# Patient Record
Sex: MALE | Race: WHITE | Employment: FULL TIME | URBAN - NONMETROPOLITAN AREA
[De-identification: names, ages, dates, MRNs, and addresses within clinical notes are randomized per-mention and may not be internally consistent; named-entity substitution may affect disease eponyms.]

---

## 2016-05-20 LAB — GLUCOSE BLD-MCNC: 87 MG/DL

## 2016-10-12 LAB
CHOLESTEROL, TOTAL: 130 MG/DL
CHOLESTEROL/HDL RATIO: 130
HDLC SERPL-MCNC: 46 MG/DL (ref 35–70)
LDL CHOLESTEROL CALCULATED: 173 MG/DL (ref 0–160)
TRIGL SERPL-MCNC: 239 MG/DL
VLDLC SERPL CALC-MCNC: 26 MG/DL

## 2017-05-30 ENCOUNTER — TELEPHONE (OUTPATIENT)
Dept: FAMILY MEDICINE CLINIC | Age: 47
End: 2017-05-30

## 2017-05-30 VITALS
HEIGHT: 75 IN | SYSTOLIC BLOOD PRESSURE: 104 MMHG | WEIGHT: 239 LBS | BODY MASS INDEX: 29.72 KG/M2 | DIASTOLIC BLOOD PRESSURE: 70 MMHG | HEART RATE: 72 BPM

## 2017-05-30 DIAGNOSIS — K21.9 GASTROESOPHAGEAL REFLUX DISEASE WITHOUT ESOPHAGITIS: ICD-10-CM

## 2017-05-30 DIAGNOSIS — F34.1 DYSTHYMIC DISORDER: ICD-10-CM

## 2017-05-30 DIAGNOSIS — Z79.01 LONG TERM (CURRENT) USE OF ANTICOAGULANTS: ICD-10-CM

## 2017-05-30 DIAGNOSIS — F51.04 CHRONIC INSOMNIA: ICD-10-CM

## 2017-05-30 DIAGNOSIS — R55 NEUROLOGIC CARDIAC SYNCOPE: ICD-10-CM

## 2017-05-30 PROBLEM — E78.5 HYPERLIPEMIA: Status: ACTIVE | Noted: 2017-05-30

## 2017-05-30 PROBLEM — J30.9 ALLERGIC RHINITIS: Status: ACTIVE | Noted: 2017-05-30

## 2017-05-30 PROBLEM — H60.90 OTITIS EXTERNA: Status: ACTIVE | Noted: 2017-05-30

## 2017-05-30 PROBLEM — I26.99 PULMONARY EMBOLUS (HCC): Status: ACTIVE | Noted: 2017-05-30

## 2017-05-30 PROBLEM — G45.9 TIA (TRANSIENT ISCHEMIC ATTACK): Status: ACTIVE | Noted: 2017-05-30

## 2017-05-30 RX ORDER — DROXIDOPA 100 MG/1
CAPSULE ORAL 3 TIMES DAILY PRN
COMMUNITY
End: 2022-05-26

## 2017-05-30 RX ORDER — CITALOPRAM 20 MG/1
20 TABLET ORAL DAILY
COMMUNITY
End: 2017-06-01 | Stop reason: ALTCHOICE

## 2017-05-30 RX ORDER — OMEPRAZOLE 20 MG/1
20 CAPSULE, DELAYED RELEASE ORAL DAILY
COMMUNITY
End: 2018-04-13 | Stop reason: SDUPTHER

## 2017-06-01 ENCOUNTER — OFFICE VISIT (OUTPATIENT)
Dept: FAMILY MEDICINE CLINIC | Age: 47
End: 2017-06-01
Payer: COMMERCIAL

## 2017-06-01 VITALS
BODY MASS INDEX: 29.43 KG/M2 | DIASTOLIC BLOOD PRESSURE: 70 MMHG | HEART RATE: 78 BPM | SYSTOLIC BLOOD PRESSURE: 120 MMHG | HEIGHT: 77 IN | WEIGHT: 249.25 LBS

## 2017-06-01 DIAGNOSIS — R55 NEUROLOGIC CARDIAC SYNCOPE: ICD-10-CM

## 2017-06-01 DIAGNOSIS — Z86.711 HISTORY OF PULMONARY EMBOLISM: ICD-10-CM

## 2017-06-01 DIAGNOSIS — K21.9 GASTROESOPHAGEAL REFLUX DISEASE WITHOUT ESOPHAGITIS: ICD-10-CM

## 2017-06-01 DIAGNOSIS — Z09 HOSPITAL DISCHARGE FOLLOW-UP: Primary | ICD-10-CM

## 2017-06-01 PROCEDURE — 99214 OFFICE O/P EST MOD 30 MIN: CPT | Performed by: NURSE PRACTITIONER

## 2017-06-01 RX ORDER — CITALOPRAM 20 MG/1
20 TABLET ORAL
COMMUNITY
End: 2020-03-17 | Stop reason: SDUPTHER

## 2017-06-02 ASSESSMENT — ENCOUNTER SYMPTOMS
CONSTIPATION: 0
SHORTNESS OF BREATH: 0
ABDOMINAL PAIN: 0
WHEEZING: 0
COUGH: 0
DIARRHEA: 0

## 2017-07-24 ENCOUNTER — TELEPHONE (OUTPATIENT)
Dept: FAMILY MEDICINE CLINIC | Age: 47
End: 2017-07-24

## 2017-08-01 ENCOUNTER — TELEPHONE (OUTPATIENT)
Dept: FAMILY MEDICINE CLINIC | Age: 47
End: 2017-08-01

## 2017-08-02 ENCOUNTER — OFFICE VISIT (OUTPATIENT)
Dept: FAMILY MEDICINE CLINIC | Age: 47
End: 2017-08-02
Payer: COMMERCIAL

## 2017-08-02 VITALS
WEIGHT: 250.8 LBS | HEART RATE: 96 BPM | HEIGHT: 77 IN | BODY MASS INDEX: 29.61 KG/M2 | DIASTOLIC BLOOD PRESSURE: 84 MMHG | RESPIRATION RATE: 16 BRPM | SYSTOLIC BLOOD PRESSURE: 120 MMHG

## 2017-08-02 DIAGNOSIS — L72.9 SUBCUTANEOUS CYST: Primary | ICD-10-CM

## 2017-08-02 PROCEDURE — 99213 OFFICE O/P EST LOW 20 MIN: CPT | Performed by: NURSE PRACTITIONER

## 2017-08-05 ASSESSMENT — ENCOUNTER SYMPTOMS
SHORTNESS OF BREATH: 0
ABDOMINAL PAIN: 0
CONSTIPATION: 0
DIARRHEA: 0
WHEEZING: 0
COUGH: 0

## 2017-08-07 ENCOUNTER — TELEPHONE (OUTPATIENT)
Dept: FAMILY MEDICINE CLINIC | Age: 47
End: 2017-08-07

## 2017-10-17 ENCOUNTER — TELEPHONE (OUTPATIENT)
Dept: FAMILY MEDICINE CLINIC | Age: 47
End: 2017-10-17

## 2017-10-17 DIAGNOSIS — K64.9 HEMORRHOIDS, UNSPECIFIED HEMORRHOID TYPE: Primary | ICD-10-CM

## 2017-10-17 NOTE — TELEPHONE ENCOUNTER
Patient called requesting referral to Dr. Robinson Robertson for hemorrhoids \" I can't take the pain and issues anymore\".   Last see 8/27/17

## 2018-04-13 RX ORDER — OMEPRAZOLE 20 MG/1
CAPSULE, DELAYED RELEASE ORAL
Qty: 180 CAPSULE | Refills: 1 | Status: SHIPPED | OUTPATIENT
Start: 2018-04-13 | End: 2018-10-10 | Stop reason: SDUPTHER

## 2018-05-11 ENCOUNTER — TELEPHONE (OUTPATIENT)
Dept: FAMILY MEDICINE CLINIC | Age: 48
End: 2018-05-11

## 2018-05-11 ENCOUNTER — OFFICE VISIT (OUTPATIENT)
Dept: FAMILY MEDICINE CLINIC | Age: 48
End: 2018-05-11
Payer: COMMERCIAL

## 2018-05-11 VITALS
OXYGEN SATURATION: 97 % | WEIGHT: 244 LBS | HEART RATE: 79 BPM | TEMPERATURE: 98 F | BODY MASS INDEX: 28.81 KG/M2 | DIASTOLIC BLOOD PRESSURE: 77 MMHG | SYSTOLIC BLOOD PRESSURE: 112 MMHG | RESPIRATION RATE: 16 BRPM

## 2018-05-11 DIAGNOSIS — R53.83 OTHER FATIGUE: ICD-10-CM

## 2018-05-11 DIAGNOSIS — Z86.711 HISTORY OF PULMONARY EMBOLISM: ICD-10-CM

## 2018-05-11 DIAGNOSIS — R55 NEAR SYNCOPE: Primary | ICD-10-CM

## 2018-05-11 DIAGNOSIS — R55 NEAR SYNCOPE: ICD-10-CM

## 2018-05-11 DIAGNOSIS — R42 DIZZINESS: ICD-10-CM

## 2018-05-11 DIAGNOSIS — R79.89 ELEVATED D-DIMER: Primary | ICD-10-CM

## 2018-05-11 LAB
AGE FOR GFR: 46
ANION GAP SERPL CALCULATED.3IONS-SCNC: 18 MMOL/L
BASOPHILS # BLD: 0.18 THOU/MM3
BUN BLDV-MCNC: 17 MG/DL
CALCIUM SERPL-MCNC: 10.6 MG/DL
CHLORIDE BLD-SCNC: 104 MMOL/L
CK MB: 0.7 NG/ML
CO2: 27 MMOL/L
CREAT SERPL-MCNC: 1.1 MG/DL
D DIMER: 366 NG/ML
DIFFERENTIAL: AUTOMATED DIFF
EGFR BF: 65 ML/MIN/1.73 M2
EGFR BM: 87 ML/MIN/1.73 M2
EGFR WF: 53 ML/MIN/1.73 M2
EGFR WM: 72 ML/MIN/1.73 M2
EOSINOPHIL # BLD: 0.37 THOU/MM3
GLUCOSE: 86 MG/DL
HCT VFR BLD CALC: 49.9 %
HEMOGLOBIN: 16.8 G/DL
LYMPHOCYTES # BLD: 2.96 THOU/MM3
MCH RBC QN AUTO: 28.4 PG
MCHC RBC AUTO-ENTMCNC: 33.7 G/DL
MCV RBC AUTO: 84.4 FL
MONOCYTES # BLD: 0.66 THOU/MM3
NEUTROPHILS: 4.76 THOU/MM3
PDW BLD-RTO: 11.9 %
PLATELET # BLD: 274 THOU/MM3
PMV BLD AUTO: 8.6 FL
POTASSIUM SERPL-SCNC: 4.6 MMOL/L
RBC # BLD: 5.91 M/UL
SODIUM BLD-SCNC: 144 MMOL/L
TROPONIN: <0.012 NG/ML
WBC # BLD: 8.92 THOU/ML3

## 2018-05-11 PROCEDURE — 99214 OFFICE O/P EST MOD 30 MIN: CPT | Performed by: NURSE PRACTITIONER

## 2018-05-11 PROCEDURE — 93000 ELECTROCARDIOGRAM COMPLETE: CPT | Performed by: NURSE PRACTITIONER

## 2018-05-11 ASSESSMENT — ENCOUNTER SYMPTOMS
SORE THROAT: 0
SWOLLEN GLANDS: 0
VOMITING: 0
CHEST TIGHTNESS: 0
CHOKING: 0
NAUSEA: 1
COUGH: 0

## 2018-10-08 ENCOUNTER — OFFICE VISIT (OUTPATIENT)
Dept: FAMILY MEDICINE CLINIC | Age: 48
End: 2018-10-08
Payer: COMMERCIAL

## 2018-10-08 VITALS
WEIGHT: 246 LBS | SYSTOLIC BLOOD PRESSURE: 104 MMHG | DIASTOLIC BLOOD PRESSURE: 84 MMHG | HEART RATE: 77 BPM | RESPIRATION RATE: 16 BRPM | OXYGEN SATURATION: 96 % | BODY MASS INDEX: 29.05 KG/M2

## 2018-10-08 DIAGNOSIS — R10.9 RIGHT FLANK PAIN: ICD-10-CM

## 2018-10-08 DIAGNOSIS — R31.29 OTHER MICROSCOPIC HEMATURIA: ICD-10-CM

## 2018-10-08 DIAGNOSIS — R55 NEAR SYNCOPE: ICD-10-CM

## 2018-10-08 DIAGNOSIS — K64.8 INTERNAL HEMORRHOIDS: Primary | ICD-10-CM

## 2018-10-08 LAB
BILIRUBIN, POC: ABNORMAL
BLOOD URINE, POC: ABNORMAL
CLARITY, POC: ABNORMAL
COLOR, POC: ABNORMAL
GLUCOSE URINE, POC: ABNORMAL
KETONES, POC: ABNORMAL
LEUKOCYTE EST, POC: ABNORMAL
NITRITE, POC: ABNORMAL
PH, POC: 7
PROTEIN, POC: ABNORMAL
SPECIFIC GRAVITY, POC: 1.01
UROBILINOGEN, POC: 0.2

## 2018-10-08 PROCEDURE — 81002 URINALYSIS NONAUTO W/O SCOPE: CPT | Performed by: NURSE PRACTITIONER

## 2018-10-08 PROCEDURE — G8484 FLU IMMUNIZE NO ADMIN: HCPCS | Performed by: NURSE PRACTITIONER

## 2018-10-08 PROCEDURE — 99214 OFFICE O/P EST MOD 30 MIN: CPT | Performed by: NURSE PRACTITIONER

## 2018-10-08 PROCEDURE — G8427 DOCREV CUR MEDS BY ELIG CLIN: HCPCS | Performed by: NURSE PRACTITIONER

## 2018-10-08 PROCEDURE — 1036F TOBACCO NON-USER: CPT | Performed by: NURSE PRACTITIONER

## 2018-10-08 PROCEDURE — G8419 CALC BMI OUT NRM PARAM NOF/U: HCPCS | Performed by: NURSE PRACTITIONER

## 2018-10-08 RX ORDER — HYDROCORTISONE ACETATE 25 MG/1
25 SUPPOSITORY RECTAL EVERY 12 HOURS
Qty: 12 SUPPOSITORY | Refills: 0 | Status: SHIPPED | OUTPATIENT
Start: 2018-10-08 | End: 2019-07-24 | Stop reason: ALTCHOICE

## 2018-10-08 ASSESSMENT — ENCOUNTER SYMPTOMS
BLOOD IN STOOL: 0
HEMATOCHEZIA: 1
ABDOMINAL PAIN: 0
ANAL BLEEDING: 1
RECTAL PAIN: 0
BOWEL INCONTINENCE: 0

## 2018-10-08 NOTE — PATIENT INSTRUCTIONS
adult-strength or 2 to 4 low-dose aspirin. Wait for an ambulance. Do not try to drive yourself.     · You have symptoms of a stroke. These may include:  ¨ Sudden numbness, tingling, weakness, or loss of movement in your face, arm, or leg, especially on only one side of your body. ¨ Sudden vision changes. ¨ Sudden trouble speaking. ¨ Sudden confusion or trouble understanding simple statements. ¨ Sudden problems with walking or balance. ¨ A sudden, severe headache that is different from past headaches.     · You passed out (lost consciousness) again.    Watch closely for changes in your health, and be sure to contact your doctor if:    · You do not get better as expected. Where can you learn more? Go to https://Arden Reed.[employer]. org and sign in to your [employer] account. Enter Q327 in the [employer] box to learn more about \"Fainting: Care Instructions. \"     If you do not have an account, please click on the \"Sign Up Now\" link. Current as of: November 20, 2017  Content Version: 11.7  © 3945-3359 [employer]. Care instructions adapted under license by [facility] If you have questions about a medical condition or this instruction, always ask your healthcare professional. [place] [address] any warranty or liability for your use of this information.        Patient Education          hydrocortisone rectal (cream, ointment, suppository)  Pronunciation:  dileep BARKER i zone REK quintin  Brand:  Anucort-HC, Anumed-HC, Anusol-HC, Cortizone-10 Anal Itch Cream, Hemorrhoidal HC, Hemril-30, Hemril-HC Uniserts, Preparation H Hydrocortisone, Procto-Kit 1%, Procto-Kit 2.5%, Procto-Carlo 1%, Proctocort, Proctocream-HC, Proctosert HC, Proctosol-HC, Proctozone HC, Proctozone-H, Recort Plus, Rectasol-HC, [facility]  What is the most important information I should know about hydrocortisone rectal?  The information in this medication guide is specific to hydrocortisone rectal cream, ointment, or suppository. Do not take hydrocortisone rectal by mouth. It is for use only in your rectum. This medication comes with patient instructions for safe and effective use. Follow these directions carefully. Ask your doctor or pharmacist if you have any questions. You may need to use this medication for up to 8 weeks. Call your doctor at once if you have any bleeding from your rectum, feeling short of breath (even with mild exertion), swelling of your ankles or feet, or rapid weight gain. There may be other drugs that can interact with hydrocortisone rectal. Tell your doctor about all medications you use. This includes prescription, over-the-counter, vitamin, and herbal products. Do not start a new medication without telling your doctor. Call your doctor if your symptoms do not improve or if they get worse after using this medicine for a few days. What is hydrocortisone rectal?  Hydrocortisone is a steroid medicine that reduces inflammation in the body. The information in this medication guide is specific to hydrocortisone rectal cream, ointment, or suppository. Hydrocortisone rectal is used to treat itching or swelling caused by hemorrhoids or other inflammatory conditions of the rectum or anus. Hydrocortisone rectal is also used together with other medications to treat ulcerative colitis, proctitis, and other inflammatory conditions of the lower intestines and rectal area. Hydrocortisone rectal may also be used for purposes not listed in this medication guide. What should I discuss with my health care provider before using hydrocortisone rectal?  Ask a doctor or pharmacist if it is safe for you to use this medicine if you have:  · congestive heart failure;  · a history of tuberculosis;  · stomach ulcer or diverticulitis;  · a colostomy or ileostomy;  · fever or any type of infection;  · kidney disease;  · high blood pressure; or  · myasthenia gravis. Also tell your doctor if you have diabetes. your doctor if you can take an over-the-counter pain medicine, such as acetaminophen (Tylenol), ibuprofen (Advil, Motrin), or naproxen (Aleve). Read and follow all instructions on the label. · If your doctor prescribed antibiotics, take them as directed. Do not stop taking them just because you feel better. You need to take the full course of antibiotics. · To apply heat, put a warm water bottle, a heating pad set on low, or a warm cloth on the painful area. Do not go to sleep with a heating pad on your skin. · To prevent dehydration, drink plenty of fluids, enough so that your urine is light yellow or clear like water. Choose water and other caffeine-free clear liquids until you feel better. If you have kidney, heart, or liver disease and have to limit fluids, talk with your doctor before you increase the amount of fluids you drink. When should you call for help? Call your doctor now or seek immediate medical care if:    · Your flank pain gets worse.     · You have new symptoms, such as fever, nausea, or vomiting.     · You have symptoms of a urinary problem. For example:  ¨ You have blood or pus in your urine. ¨ You have chills or body aches. ¨ It hurts to urinate. ¨ You have groin or belly pain.    Watch closely for changes in your health, and be sure to contact your doctor if you do not get better as expected. Where can you learn more? Go to https://My[email or web address]. org and sign in to your [employer] account. Enter S191 in the [facility] box to learn more about \"Flank Pain: Care Instructions. \"     If you do not have an account, please click on the \"Sign Up Now\" link. Current as of: November 20, 2017  Content Version: 11.7  © 0381-9510 [employer]. Care instructions adapted under license by [facility] ([facility]).  If you have questions about a medical condition or this instruction, always ask your healthcare professional. Darius Rivas disclaims any warranty or

## 2018-10-08 NOTE — LETTER
[facility]  [address]  Phone: [phone]  Fax: [phone]    ALBARO Ovalles CNP        October 8, 2018     Patient: Ramón Hernandez   YOB: 1970   Date of Visit: 10/8/2018       To Whom It May Concern: It is my medical opinion that Endy  should be excused from work today 10/8/18 due to illness. If you have any questions or concerns, please don't hesitate to call.     Sincerely,        ALBARO Ovalles CNP

## 2018-10-08 NOTE — PROGRESS NOTES
[facility] [place] [place] [address] [address] [facility], APRN-[facility]  [address]  Phone:  [phone]  Fax:  [phone]  Kale Benson is a 53 y.o. male who presents today for his medical conditions/complaints as noted below. Kale Benson c/o of Loss of Consciousness (has been dealing with his cardiogenic syncope the last couple days); Rectal Bleeding (has had hemerrhoids and today had more than normal blood in stool when he wiped); and Flank Pain (right flank pain for approx a week)    Patient states he has a history of cardiogenic syncope for which he has a pacemaker. He has instructions from his cardiologist for this. His concerns today are his rectal bleeding and flank pain. He does not need anything for his near syncope other than a work note for today  HPI:     Rectal Bleeding    The current episode started today (recurrent hemorrhoids. New episode today. Had an area of bright red blood on the toilet paper approximately 1.5 cm by 4 cm. ). The onset was sudden. Progression since onset: increased amount than normal. The patient is experiencing no pain. Treatments tried: Preparation H. Associated symptoms include hemorrhoids. Pertinent negatives include no anorexia, no fever, no abdominal pain and no rectal pain. Flank Pain   This is a new problem. The current episode started in the past 7 days (unsure if it is the flank or back pain. ). The problem has been waxing and waning since onset. The pain is present in the lumbar spine. The quality of the pain is described as aching (with an occassional stabbing pain). The pain does not radiate. The pain is at a severity of 5/10. The pain is moderate. Pertinent negatives include no abdominal pain, bladder incontinence, bowel incontinence, dysuria, fever, leg pain, numbness or tingling. He has tried nothing for the symptoms.        Wt Readings from Last 3 Encounters:   10/08/18 246 lb (111.6 kg)   05/11/18 244 lb (110.7 kg)   08/02/17 250 lb Musculoskeletal: Normal range of motion. Lymphadenopathy:     He has no cervical adenopathy. Neurological: He is alert and oriented to person, place, and time. Skin: Skin is warm, dry and intact. He is not diaphoretic. No pallor. Psychiatric: He has a normal mood and affect. His speech is normal and behavior is normal. Judgment and thought content normal. Cognition and memory are normal.       Assessment:      Diagnosis Orders   1. Internal hemorrhoids  hydrocortisone (ANUSOL-HC) 25 MG suppository   2. Other microscopic hematuria  Urinalysis With Microscopic   3. Right flank pain  Urinalysis With Microscopic    POCT Urinalysis no Micro   4. Near syncope       Results for POC orders placed in visit on 10/08/18   POCT Urinalysis no Micro   Result Value Ref Range    Color, UA      Clarity, UA      Glucose, UA POC neg     Bilirubin, UA neg     Ketones, UA neg     Spec Grav, UA 1.015     Blood, UA POC 1+     pH, UA 7.0     Protein, UA POC neg     Urobilinogen, UA 0.2     Leukocytes, UA neg     Nitrite, UA neg                Plan:     Patient was offered CT to check for stone. He would like to just see if the pain resolved with Ibuprofen. Repeat urinalysis in 2 weeks if still having pain. Rectal suppository twice daily for hemorrhoid. Ibuprofen 800 mg orally every 8 hours as needed for flank pain. Increase Northera dose as directed by cardiologist.   Repeat urinalysis in 2 weeks. Off work note for both of you today. And additional note for tomorrow. One for wife as well. Follow up  as needed. Return if symptoms worsen or fail to improve. Patient Instructions       Rectal suppository twice daily for hemorrhoid. Ibuprofen 800 mg orally every 8 hours as needed for flank pain. Increase Northera dose as directed by cardiologist.   Repeat urinalysis in 2 weeks. Off work note for both of you today. And additional note for tomorrow. One for wife as well. Follow up  as needed.        Fainting: Care Instructions  Your Care Instructions    When you faint, or pass out, you lose consciousness for a short time. A brief drop in blood flow to the brain often causes it. When you fall or lie down, more blood flows to your brain and you regain consciousness. Emotional stress, pain, or overheating-especially if you have been standing-can make you faint. In these cases, fainting is usually not serious. But fainting can be a sign of a more serious problem. Your doctor may want you to have more tests to rule out other causes. The treatment you need depends on the reason why you fainted. The doctor has checked you carefully, but problems can develop later. If you notice any problems or new symptoms, get medical treatment right away. Follow-up care is a key part of your treatment and safety. Be sure to make and go to all appointments, and call your doctor if you are having problems. It's also a good idea to know your test results and keep a list of the medicines you take. How can you care for yourself at home? · Drink plenty of fluids to prevent dehydration. If you have kidney, heart, or liver disease and have to limit fluids, talk with your doctor before you increase your fluid intake. When should you call for help? Call 911 anytime you think you may need emergency care. For example, call if:    · You have symptoms of a heart problem. These may include:  ¨ Chest pain or pressure. ¨ Severe trouble breathing. ¨ A fast or irregular heartbeat. ¨ Lightheadedness or sudden weakness. ¨ Coughing up pink, foamy mucus. ¨ Passing out. After you call 911, the  may tell you to chew 1 adult-strength or 2 to 4 low-dose aspirin. Wait for an ambulance. Do not try to drive yourself.     · You have symptoms of a stroke. These may include:  ¨ Sudden numbness, tingling, weakness, or loss of movement in your face, arm, or leg, especially on only one side of your body. ¨ Sudden vision changes.   ¨ Sudden trouble weeks. Call your doctor at once if you have any bleeding from your rectum, feeling short of breath (even with mild exertion), swelling of your ankles or feet, or rapid weight gain. There may be other drugs that can interact with hydrocortisone rectal. Tell your doctor about all medications you use. This includes prescription, over-the-counter, vitamin, and herbal products. Do not start a new medication without telling your doctor. Call your doctor if your symptoms do not improve or if they get worse after using this medicine for a few days. What is hydrocortisone rectal?  Hydrocortisone is a steroid medicine that reduces inflammation in the body. The information in this medication guide is specific to hydrocortisone rectal cream, ointment, or suppository. Hydrocortisone rectal is used to treat itching or swelling caused by hemorrhoids or other inflammatory conditions of the rectum or anus. Hydrocortisone rectal is also used together with other medications to treat ulcerative colitis, proctitis, and other inflammatory conditions of the lower intestines and rectal area. Hydrocortisone rectal may also be used for purposes not listed in this medication guide. What should I discuss with my health care provider before using hydrocortisone rectal?  Ask a doctor or pharmacist if it is safe for you to use this medicine if you have:  · congestive heart failure;  · a history of tuberculosis;  · stomach ulcer or diverticulitis;  · a colostomy or ileostomy;  · fever or any type of infection;  · kidney disease;  · high blood pressure; or  · myasthenia gravis. Also tell your doctor if you have diabetes. Steroid medicines may increase the glucose (sugar) levels in your blood or urine. You may also need to adjust the dose of your diabetes medications. FDA pregnancy category C. It is not known whether hydrocortisone rectal will harm an unborn baby.  Tell your doctor if you are pregnant or plan to become pregnant while using Patient agreed with treatment plan. Follow up as directed.            Electronically signed by ALBARO Garcia CNP on 10/8/2018

## 2018-10-10 RX ORDER — OMEPRAZOLE 20 MG/1
CAPSULE, DELAYED RELEASE ORAL
Qty: 180 CAPSULE | Refills: 1 | Status: SHIPPED | OUTPATIENT
Start: 2018-10-10 | End: 2019-03-14 | Stop reason: SDUPTHER

## 2018-12-12 ENCOUNTER — OFFICE VISIT (OUTPATIENT)
Dept: FAMILY MEDICINE CLINIC | Age: 48
End: 2018-12-12
Payer: COMMERCIAL

## 2018-12-12 VITALS
HEART RATE: 90 BPM | SYSTOLIC BLOOD PRESSURE: 106 MMHG | TEMPERATURE: 98.4 F | RESPIRATION RATE: 14 BRPM | WEIGHT: 245 LBS | BODY MASS INDEX: 28.93 KG/M2 | DIASTOLIC BLOOD PRESSURE: 76 MMHG

## 2018-12-12 DIAGNOSIS — H66.003 ACUTE SUPPURATIVE OTITIS MEDIA OF BOTH EARS WITHOUT SPONTANEOUS RUPTURE OF TYMPANIC MEMBRANES, RECURRENCE NOT SPECIFIED: Primary | ICD-10-CM

## 2018-12-12 DIAGNOSIS — M77.8 LEFT ELBOW TENDONITIS: ICD-10-CM

## 2018-12-12 PROCEDURE — 1036F TOBACCO NON-USER: CPT | Performed by: NURSE PRACTITIONER

## 2018-12-12 PROCEDURE — G8484 FLU IMMUNIZE NO ADMIN: HCPCS | Performed by: NURSE PRACTITIONER

## 2018-12-12 PROCEDURE — G8419 CALC BMI OUT NRM PARAM NOF/U: HCPCS | Performed by: NURSE PRACTITIONER

## 2018-12-12 PROCEDURE — G8427 DOCREV CUR MEDS BY ELIG CLIN: HCPCS | Performed by: NURSE PRACTITIONER

## 2018-12-12 PROCEDURE — 99213 OFFICE O/P EST LOW 20 MIN: CPT | Performed by: NURSE PRACTITIONER

## 2018-12-12 RX ORDER — CEFUROXIME AXETIL 500 MG/1
500 TABLET ORAL 2 TIMES DAILY
Qty: 20 TABLET | Refills: 0 | Status: SHIPPED | OUTPATIENT
Start: 2018-12-12 | End: 2018-12-22

## 2018-12-12 ASSESSMENT — PATIENT HEALTH QUESTIONNAIRE - PHQ9
SUM OF ALL RESPONSES TO PHQ9 QUESTIONS 1 & 2: 0
1. LITTLE INTEREST OR PLEASURE IN DOING THINGS: 0
2. FEELING DOWN, DEPRESSED OR HOPELESS: 0
SUM OF ALL RESPONSES TO PHQ QUESTIONS 1-9: 0
SUM OF ALL RESPONSES TO PHQ QUESTIONS 1-9: 0

## 2018-12-12 ASSESSMENT — ENCOUNTER SYMPTOMS
DIARRHEA: 0
NAUSEA: 0
RHINORRHEA: 0
VOMITING: 0
COUGH: 0
SINUS PRESSURE: 0
SHORTNESS OF BREATH: 0
WHEEZING: 0
SORE THROAT: 0

## 2018-12-12 NOTE — PROGRESS NOTES
[address] [place] [address] [address]  Dept: [phone]  Dept Fax: [phone]      Baron Rodriguez is a 53 y.o. male who presents today for his medical conditions/complaints as noted below. Chief Complaint   Patient presents with    Arm Pain     elbow/arm pain. Did something to it a year ago and pain is  unbearable    Otalgia     ear drums are sore       HPI:     Arm Pain    The incident occurred more than 1 week ago. There was no injury mechanism. The pain is present in the left elbow. The quality of the pain is described as aching. The pain does not radiate. The pain is at a severity of 6/10. The pain is mild. The pain has been fluctuating since the incident. Pertinent negatives include no chest pain, muscle weakness, numbness or tingling. The symptoms are aggravated by movement and lifting. He has tried acetaminophen for the symptoms. The treatment provided mild relief. Otalgia    There is pain in both ears. This is a new problem. The current episode started 1 to 4 weeks ago. The problem occurs constantly. The problem has been gradually worsening. There has been no fever. The pain is at a severity of 3/10. The pain is mild. Associated symptoms include ear discharge (bilateral \"clear wax color\") and headaches (bilateral temporal). Pertinent negatives include no coughing, diarrhea, hearing loss, neck pain, rhinorrhea, sore throat or vomiting. He has tried nothing for the symptoms. The treatment provided no relief. His past medical history is significant for a tympanostomy tube (as a child).        Current Outpatient Prescriptions   Medication Sig Dispense Refill    omeprazole (PRILOSEC) 20 MG delayed release capsule TAKE 1 CAPSULE TWICE A  capsule 1    hydrocortisone (ANUSOL-HC) 25 MG suppository Place 1 suppository rectally every 12 hours 12 suppository 0    citalopram (CELEXA) 20 MG tablet Take 20 mg by mouth      Droxidopa (NORTHERA)

## 2019-03-14 RX ORDER — OMEPRAZOLE 20 MG/1
CAPSULE, DELAYED RELEASE ORAL
Qty: 180 CAPSULE | Refills: 1 | Status: SHIPPED | OUTPATIENT
Start: 2019-03-14 | End: 2019-12-15 | Stop reason: SDUPTHER

## 2019-06-27 ENCOUNTER — TELEPHONE (OUTPATIENT)
Dept: FAMILY MEDICINE CLINIC | Age: 49
End: 2019-06-27

## 2019-06-27 NOTE — TELEPHONE ENCOUNTER
Patient called to report that he had a fainting episode yesterday at work. His wife had to come and get him and bring him home. He reports today that he is back at work and is having heart palpations and his head still feels foggy. I informed him that he needs to go to the nearest ER to be evaluated ASAP and to have someone drive him to the ER to be evaluated and to call our office for a f/u visit. Last seen in our office 12/2018.

## 2019-07-24 ENCOUNTER — OFFICE VISIT (OUTPATIENT)
Dept: FAMILY MEDICINE CLINIC | Age: 49
End: 2019-07-24
Payer: COMMERCIAL

## 2019-07-24 VITALS
OXYGEN SATURATION: 98 % | DIASTOLIC BLOOD PRESSURE: 70 MMHG | HEART RATE: 75 BPM | SYSTOLIC BLOOD PRESSURE: 102 MMHG | BODY MASS INDEX: 29.52 KG/M2 | WEIGHT: 250 LBS | HEIGHT: 77 IN

## 2019-07-24 DIAGNOSIS — R55 NEUROCARDIOGENIC SYNCOPE: Primary | ICD-10-CM

## 2019-07-24 DIAGNOSIS — K21.9 GASTROESOPHAGEAL REFLUX DISEASE WITHOUT ESOPHAGITIS: ICD-10-CM

## 2019-07-24 DIAGNOSIS — F34.1 DYSTHYMIC DISORDER: ICD-10-CM

## 2019-07-24 DIAGNOSIS — F51.04 CHRONIC INSOMNIA: ICD-10-CM

## 2019-07-24 DIAGNOSIS — E78.49 OTHER HYPERLIPIDEMIA: ICD-10-CM

## 2019-07-24 PROCEDURE — G8427 DOCREV CUR MEDS BY ELIG CLIN: HCPCS | Performed by: NURSE PRACTITIONER

## 2019-07-24 PROCEDURE — 1036F TOBACCO NON-USER: CPT | Performed by: NURSE PRACTITIONER

## 2019-07-24 PROCEDURE — G8419 CALC BMI OUT NRM PARAM NOF/U: HCPCS | Performed by: NURSE PRACTITIONER

## 2019-07-24 PROCEDURE — 99214 OFFICE O/P EST MOD 30 MIN: CPT | Performed by: NURSE PRACTITIONER

## 2019-07-24 NOTE — PROGRESS NOTES
[address] [place] [address] [address] [facility]  Dept: [phone]  Dept Fax: [phone]    Augustin Ku is a 51 y.o. male who presents today for his medical conditions/complaints as noted below. Augustin Ku c/o of Loss of Consciousness (has cardiogenic syncope-feeling better today)    HPI  Patient presents to the office for follow up after a syncopal episode 2 weeks ago. He reports feeling foggy after it happened. He was driving, suddenly felt nauseous, and broke into a \"cold sweat\". He pulled over and had his wife pick him up. He called his cardiologist, Dr. Dominique Lara to report the incident. Patient reports his pacemaker information was reviewed and he had a syncopal episode. He went back to work the next day, worked half day due to feeling \"foggy\", \"weird\". Symptoms completely resolved over the weekend. He denies chest pain, sob, nausea, or lightheaded at this time. He also reports an episode of forgetting a coworkers name and needed to be reminded of it. History is significant for neurocardiogenic syncope. Next follow up with cardiologist is scheduled 8/2019.      BP Readings from Last 3 Encounters:   07/24/19 102/70   12/12/18 106/76   10/08/18 104/84          ([id number])    Wt Readings from Last 3 Encounters:   07/24/19 250 lb (113.4 kg)   12/12/18 245 lb (111.1 kg)   10/08/18 246 lb (111.6 kg)       Past Medical History:   Diagnosis Date    Heart palpitations     MICHELLE (obstructive sleep apnea)     PE (pulmonary thromboembolism) ([facility] [address] 05/25/2017    Bilateral PEs     Pulmonary embolism [facility])     [facility] - ? post procedural    TIA (transient ischemic attack) 09/2016      Past Surgical History:   Procedure Laterality Date    MALIGNANT SKIN LESION EXCISION  2012    PACEMAKER INSERTION  09/2014    VASECTOMY  09/2014       Family History   Problem Relation Age of Onset    Diabetes Mother     Cancer Brother         Bone Cancer Osteocarcinoma

## 2019-08-02 ASSESSMENT — ENCOUNTER SYMPTOMS
COUGH: 0
WHEEZING: 0
DIARRHEA: 0
CONSTIPATION: 0
EYES NEGATIVE: 1
ABDOMINAL PAIN: 0
SHORTNESS OF BREATH: 0

## 2019-10-24 ENCOUNTER — OFFICE VISIT (OUTPATIENT)
Dept: FAMILY MEDICINE CLINIC | Age: 49
End: 2019-10-24
Payer: COMMERCIAL

## 2019-10-24 VITALS
WEIGHT: 254 LBS | SYSTOLIC BLOOD PRESSURE: 104 MMHG | HEIGHT: 77 IN | BODY MASS INDEX: 29.99 KG/M2 | DIASTOLIC BLOOD PRESSURE: 58 MMHG | OXYGEN SATURATION: 97 % | HEART RATE: 88 BPM

## 2019-10-24 DIAGNOSIS — S76.312A STRAIN OF LEFT HAMSTRING, INITIAL ENCOUNTER: ICD-10-CM

## 2019-10-24 DIAGNOSIS — S76.311A STRAIN OF RIGHT HAMSTRING, INITIAL ENCOUNTER: Primary | ICD-10-CM

## 2019-10-24 PROCEDURE — G8427 DOCREV CUR MEDS BY ELIG CLIN: HCPCS | Performed by: NURSE PRACTITIONER

## 2019-10-24 PROCEDURE — G8417 CALC BMI ABV UP PARAM F/U: HCPCS | Performed by: NURSE PRACTITIONER

## 2019-10-24 PROCEDURE — G8484 FLU IMMUNIZE NO ADMIN: HCPCS | Performed by: NURSE PRACTITIONER

## 2019-10-24 PROCEDURE — 99213 OFFICE O/P EST LOW 20 MIN: CPT | Performed by: NURSE PRACTITIONER

## 2019-10-24 PROCEDURE — 1036F TOBACCO NON-USER: CPT | Performed by: NURSE PRACTITIONER

## 2019-10-24 RX ORDER — NAPROXEN 500 MG/1
500 TABLET ORAL 2 TIMES DAILY WITH MEALS
Qty: 30 TABLET | Refills: 1 | Status: SHIPPED | OUTPATIENT
Start: 2019-10-24 | End: 2020-01-17

## 2019-10-30 ASSESSMENT — ENCOUNTER SYMPTOMS
COUGH: 0
SHORTNESS OF BREATH: 0
WHEEZING: 0

## 2019-12-16 RX ORDER — OMEPRAZOLE 20 MG/1
CAPSULE, DELAYED RELEASE ORAL
Qty: 180 CAPSULE | Refills: 4 | Status: SHIPPED | OUTPATIENT
Start: 2019-12-16 | End: 2020-12-21 | Stop reason: SDUPTHER

## 2020-01-17 ENCOUNTER — OFFICE VISIT (OUTPATIENT)
Dept: FAMILY MEDICINE CLINIC | Age: 50
End: 2020-01-17
Payer: COMMERCIAL

## 2020-01-17 VITALS
WEIGHT: 255.8 LBS | DIASTOLIC BLOOD PRESSURE: 74 MMHG | SYSTOLIC BLOOD PRESSURE: 104 MMHG | BODY MASS INDEX: 30.33 KG/M2 | HEART RATE: 77 BPM | OXYGEN SATURATION: 98 %

## 2020-01-17 PROCEDURE — G8427 DOCREV CUR MEDS BY ELIG CLIN: HCPCS | Performed by: FAMILY MEDICINE

## 2020-01-17 PROCEDURE — G8417 CALC BMI ABV UP PARAM F/U: HCPCS | Performed by: FAMILY MEDICINE

## 2020-01-17 PROCEDURE — 99213 OFFICE O/P EST LOW 20 MIN: CPT | Performed by: FAMILY MEDICINE

## 2020-01-17 PROCEDURE — G8484 FLU IMMUNIZE NO ADMIN: HCPCS | Performed by: FAMILY MEDICINE

## 2020-01-17 PROCEDURE — 1036F TOBACCO NON-USER: CPT | Performed by: FAMILY MEDICINE

## 2020-01-17 ASSESSMENT — ENCOUNTER SYMPTOMS
COUGH: 1
SWOLLEN GLANDS: 1
SORE THROAT: 1
VOMITING: 0
NAUSEA: 0

## 2020-01-17 NOTE — PROGRESS NOTES
[address] [address] [address]  Dept: [phone]  Dept [facility]:[phone]    Felicia Weber is a 51 y.o. male who presents today for his medical conditions/complaints as notedbelow. Felicia Weber is c/o of Pharyngitis (states a while back in maybe December I was seen at an Urgent Care and told that I had prepneumonia. i thought that I was getting better. My cough started back up after the holidays, it is dry i am not bringing anything up yet. both of my ears hurt and my throat is killing me it started on one side and now its on both. )      HPI:     Pharyngitis   This is a recurrent problem. The current episode started in the past 7 days (about 1 week ago). Associated symptoms include coughing (onlyminor, alot worse in Dec), a sore throat and swollen glands. Pertinent negatives include no congestion, nausea, rash, vomiting or weakness. During the day it is Kathy Ella but then at night it really gets bad again.     BP Readings from Last 3 Encounters:   01/17/20 104/74   10/24/19 (!) 104/58   07/24/19 102/70          (goal 120/80)    Wt Readings from Last 3 Encounters:   01/17/20 255 lb 12.8 oz (116 kg)   10/24/19 254 lb (115.2 kg)   07/24/19 250 lb (113.4 kg)        Past Medical History:   Diagnosis Date    Heart palpitations     MICHELLE (obstructive sleep apnea)     PE (pulmonary thromboembolism) ([facility] [address] 05/25/2017    Bilateral PEs     Pulmonary embolism [facility] - ? post procedural    TIA (transient ischemic attack) 09/2016      Past Surgical History:   Procedure Laterality Date    MALIGNANT SKIN LESION EXCISION  2012    PACEMAKER INSERTION  09/2014    VASECTOMY  09/2014       Family History   Problem Relation Age of Onset    Diabetes Mother     Cancer Brother         Bone Cancer Osteocarcinoma 1991       Social History     Tobacco Use    Smoking status: Never Smoker    Smokeless tobacco: Never Used   Substance Use Topics    Alcohol use: No      Current Outpatient Medications   Medication Sig Dispense Refill    omeprazole (PRILOSEC) 20 MG delayed release capsule TAKE 1 CAPSULE TWICE A  capsule 4    citalopram (CELEXA) 20 MG tablet Take 20 mg by mouth      Droxidopa (NORTHERA) 100 MG CAPS Take by mouth 3 times daily as needed       No current facility-administered medications for this visit. Allergies   Allergen Reactions    Erythromycin      Hand swelling    Influenza Vaccines      SVT    Lipitor [Atorvastatin]      bruising    Nitroglycerin      Syncopal episode    Nubain [Nalbuphine]      Hand swelling    Other      Adhesive tape , skin tear     Valium [Diazepam]      Does not like how he felt    Warfarin        Health Maintenance   Topic Date Due    DTaP/Tdap/Td vaccine (1 - Tdap) 07/27/1982    HIV screen  07/27/1986    Lipid screen  10/12/2021    Pneumococcal 0-64 years Vaccine  Aged Out       Subjective:      Review of Systems   HENT: Positive for sore throat. Negative for congestion. Respiratory: Positive for cough (onlyminor, alot worse in Dec). Gastrointestinal: Negative for nausea and vomiting. Skin: Negative for rash. Neurological: Negative for weakness. Objective:     /74   Pulse 77   Wt 255 lb 12.8 oz (116 kg)   SpO2 98%   BMI 30.33 kg/m²     Physical Exam  Vitals signs and nursing note reviewed. Constitutional:       General: He is not in acute distress. Appearance: Normal appearance. He is well-developed. He is not ill-appearing. HENT:      Head: Normocephalic and atraumatic. Right Ear: Tympanic membrane and external ear normal. There is impacted cerumen. Left Ear: Tympanic membrane and external ear normal. There is impacted cerumen.       Ears:      Comments: Bilateral moderate impacted cerumen but TMs are visible behind and are not erythematous     Mouth/Throat:      Mouth: Mucous membranes are moist.      Pharynx: Posterior oropharyngeal erythema present. No oropharyngeal exudate. Comments: History oropharynx is erythematous and mildly edematous but no exudate tonsillar area is unremarkable. Uvula is not edematous or erythematous  Eyes:      Conjunctiva/sclera: Conjunctivae normal.      Pupils: Pupils are equal, round, and reactive to light. Neck:      Musculoskeletal: Normal range of motion and neck supple. Thyroid: No thyromegaly. Cardiovascular:      Rate and Rhythm: Normal rate and regular rhythm. Heart sounds: Normal heart sounds. No murmur. Pulmonary:      Effort: Pulmonary effort is normal.      Breath sounds: Normal breath sounds. Lymphadenopathy:      Cervical: Cervical adenopathy (Mild bilateral anterior cervical) present. Skin:     General: Skin is warm and dry. Capillary Refill: Capillary refill takes less than 2 seconds. Neurological:      General: No focal deficit present. Mental Status: He is alert. Psychiatric:         Mood and Affect: Mood normal.         Behavior: Behavior normal.         Assessment/Plan:      Diagnosis Orders   1. Acute viral pharyngitis       Push the fluids. NSAIDs or acetaminophen as needed for discomfort. Salt water gargles. Duration of symptoms likelihood was reviewed with the patient if symptoms are increasing high fevers other concerns significant increase in dysphasia to return for reevaluation    Lab Results   Component Value Date    WBC 8.92 05/11/2018    HGB 16.8 05/11/2018    HCT 49.9 05/11/2018     05/11/2018    CHOL 130 10/12/2016    TRIG 239 10/12/2016    HDL 46 10/12/2016     05/11/2018    K 4.6 05/11/2018     05/11/2018    CREATININE 1.1 05/11/2018    BUN 17 05/11/2018    CO2 27 05/11/2018       No follow-ups on file. Patient given educational materials - see patientinstructions. Discussed use, benefit, and side effects of prescribed medications. All patient questions answered. Pt voiced understanding. Reviewed health maintenance. Instructed to continue current medications, diet andexercise. Patient agreed with treatment plan. Follow up as directed.      Electronically signed by Leopold Reichert, MD on 1/17/2020

## 2020-02-12 ENCOUNTER — OFFICE VISIT (OUTPATIENT)
Dept: FAMILY MEDICINE CLINIC | Age: 50
End: 2020-02-12
Payer: COMMERCIAL

## 2020-02-12 VITALS
BODY MASS INDEX: 29.89 KG/M2 | WEIGHT: 252.1 LBS | HEART RATE: 76 BPM | RESPIRATION RATE: 18 BRPM | SYSTOLIC BLOOD PRESSURE: 112 MMHG | TEMPERATURE: 98 F | DIASTOLIC BLOOD PRESSURE: 70 MMHG | OXYGEN SATURATION: 99 %

## 2020-02-12 PROBLEM — Z79.01 LONG TERM CURRENT USE OF ANTICOAGULANT THERAPY: Status: RESOLVED | Noted: 2017-05-30 | Resolved: 2020-02-12

## 2020-02-12 PROBLEM — H60.90 OTITIS EXTERNA: Status: RESOLVED | Noted: 2017-05-30 | Resolved: 2020-02-12

## 2020-02-12 PROBLEM — Z95.0 PRESENCE OF CARDIAC PACEMAKER: Status: ACTIVE | Noted: 2020-02-03

## 2020-02-12 PROBLEM — G45.9 TIA (TRANSIENT ISCHEMIC ATTACK): Status: RESOLVED | Noted: 2017-05-30 | Resolved: 2020-02-12

## 2020-02-12 PROBLEM — I26.99 PULMONARY EMBOLUS (HCC): Status: RESOLVED | Noted: 2017-05-30 | Resolved: 2020-02-12

## 2020-02-12 LAB — HBA1C MFR BLD: 5.2 %

## 2020-02-12 PROCEDURE — 99396 PREV VISIT EST AGE 40-64: CPT | Performed by: NURSE PRACTITIONER

## 2020-02-12 PROCEDURE — 83036 HEMOGLOBIN GLYCOSYLATED A1C: CPT | Performed by: NURSE PRACTITIONER

## 2020-02-12 RX ORDER — TRAZODONE HYDROCHLORIDE 50 MG/1
50 TABLET ORAL NIGHTLY
Qty: 30 TABLET | Refills: 1 | Status: SHIPPED | OUTPATIENT
Start: 2020-02-12 | End: 2020-03-17 | Stop reason: ALTCHOICE

## 2020-02-12 ASSESSMENT — ENCOUNTER SYMPTOMS
EYES NEGATIVE: 1
DIARRHEA: 0
SHORTNESS OF BREATH: 0
NAUSEA: 0
ABDOMINAL PAIN: 0
CONSTIPATION: 0
WHEEZING: 0
COUGH: 0

## 2020-02-12 NOTE — PROGRESS NOTES
2014    TIA (transient ischemic attack) 09/2016       Past Surgical History:   Procedure Laterality Date    MALIGNANT SKIN LESION EXCISION  2012    PACEMAKER INSERTION  09/2014    VASECTOMY  09/2014       Family History   Problem Relation Age of Onset    Diabetes Mother     Cancer Brother         Bone Cancer Osteocarcinoma 1991       Social History     Socioeconomic History    Marital status:      Spouse name: Not on file    Number of children: Not on file    Years of education: Not on file    Highest education level: Not on file   Occupational History    Not on file   Social Needs    Financial resource strain: Not on file    Food insecurity:     Worry: Not on file     Inability: Not on file    Transportation needs:     Medical: Not on file     Non-medical: Not on file   Tobacco Use    Smoking status: Never Smoker    Smokeless tobacco: Never Used   Substance and Sexual Activity    Alcohol use: No    Drug use: Not on file    Sexual activity: Not on file   Lifestyle    Physical activity:     Days per week: Not on file     Minutes per session: Not on file    Stress: Not on file   Relationships    Social connections:     Talks on phone: Not on file     Gets together: Not on file     Attends Quaker service: Not on file     Active member of club or organization: Not on file     Attends meetings of clubs or organizations: Not on file     Relationship status: Not on file    Intimate partner violence:     Fear of current or ex partner: Not on file     Emotionally abused: Not on file     Physically abused: Not on file     Forced sexual activity: Not on file   Other Topics Concern    Not on file   Social History Narrative    Not on file       Allergies   Allergen Reactions    Adhesive Tape     Azithromycin     Erythromycin      Hand swelling    Influenza Vaccines      SVT    Lipitor [Atorvastatin]      bruising    Nitroglycerin      Syncopal episode    Nubain [Nalbuphine]      Hand weakness and headaches. Psychiatric/Behavioral: Negative for dysphoric mood, self-injury, sleep disturbance and suicidal ideas. The patient is not nervous/anxious. Blood pressure 112/70, pulse 76, temperature 98 °F (36.7 °C), resp. rate 18, weight 252 lb 1.6 oz (114.4 kg), SpO2 99 %. Physical Exam  Constitutional:       Appearance: Normal appearance. He is well-developed and well-groomed. HENT:      Head: Normocephalic. Right Ear: Tympanic membrane, ear canal and external ear normal.      Left Ear: Tympanic membrane, ear canal and external ear normal.      Nose: Nose normal.      Mouth/Throat:      Mouth: Mucous membranes are moist.      Pharynx: Oropharynx is clear. Eyes:      Conjunctiva/sclera: Conjunctivae normal.      Pupils: Pupils are equal, round, and reactive to light. Neck:      Musculoskeletal: Neck supple. Thyroid: No thyromegaly. Cardiovascular:      Rate and Rhythm: Normal rate and regular rhythm. Heart sounds: Normal heart sounds. No murmur. Pulmonary:      Effort: Pulmonary effort is normal.      Breath sounds: Normal breath sounds. No wheezing. Abdominal:      General: Bowel sounds are normal.      Palpations: Abdomen is soft. Tenderness: There is no abdominal tenderness. Musculoskeletal:      Right lower leg: No edema. Left lower leg: No edema. Lymphadenopathy:      Cervical: No cervical adenopathy. Skin:     Capillary Refill: Capillary refill takes less than 2 seconds. Neurological:      Mental Status: He is alert and oriented to person, place, and time. Gait: Gait normal.   Psychiatric:         Mood and Affect: Mood normal.         Behavior: Behavior is cooperative.          PHQ Scores 12/12/2018   PHQ2 Score 0   PHQ9 Score 0     Interpretation of Total Score Depression Severity: 1-4 = Minimal depression, 5-9 = Mild depression, 10-14 = Moderate depression, 15-19 = Moderately severe depression, 20-27 = Severe depression    ASSESSMENT:    1. Encounter for wellness examination in adult    2. Chronic insomnia    3. Other hyperlipidemia    4. Allergic rhinitis, unspecified seasonality, unspecified trigger    5. Dysthymic disorder    6. Syncope, unspecified syncope type    7. Gastroesophageal reflux disease without esophagitis    8. TIA (transient ischemic attack)    9. Need for Tdap vaccination    10. Screening for diabetes mellitus        PLAN:  Orders Placed This Encounter   Medications    Tdap (ADACEL) 5-2-15.5 LF-MCG/0.5 injection     Sig: Inject 0.5 mLs into the muscle once for 1 dose     Dispense:  0.5 mL     Refill:  0    traZODone (DESYREL) 50 MG tablet     Sig: Take 1 tablet by mouth nightly     Dispense:  30 tablet     Refill:  1       Orders Placed This Encounter   Procedures    Basic Metabolic Panel     Standing Status:   Future     Standing Expiration Date:   2/11/2021    Lipid Panel     Standing Status:   Future     Standing Expiration Date:   2/12/2021     Order Specific Question:   Is Patient Fasting?/# of Hours     Answer:   8    POCT glycosylated hemoglobin (Hb A1C)       Results for POC orders placed in visit on 02/12/20   POCT glycosylated hemoglobin (Hb A1C)   Result Value Ref Range    Hemoglobin A1C 5.2 %     Return for annual exam.       Encouraged monthly testicular exams, healthy diet and routine exercise. Instructed to continuecurrent medications. He was also counseled on his preventative health maintenance recommendations and follow-up.     Electronically signed by ALBARO Pacheco CNP on 2/12/2020

## 2020-02-20 ENCOUNTER — HOSPITAL ENCOUNTER (OUTPATIENT)
Age: 50
Setting detail: SPECIMEN
Discharge: HOME | End: 2020-02-20
Payer: COMMERCIAL

## 2020-02-20 LAB
ANION GAP SERPL CALCULATED.3IONS-SCNC: 14 MMOL/L (ref 9–17)
BUN BLDV-MCNC: 19 MG/DL (ref 6–20)
BUN/CREAT BLD: 20 (ref 9–20)
CALCIUM SERPL-MCNC: 10.1 MG/DL (ref 8.6–10.4)
CHLORIDE BLD-SCNC: 101 MMOL/L (ref 98–107)
CHOLESTEROL/HDL RATIO: 6.3
CHOLESTEROL: 228 MG/DL
CO2: 24 MMOL/L (ref 20–31)
CREAT SERPL-MCNC: 0.94 MG/DL (ref 0.7–1.2)
GFR AFRICAN AMERICAN: >60 ML/MIN
GFR NON-AFRICAN AMERICAN: >60 ML/MIN
GFR SERPL CREATININE-BSD FRML MDRD: NORMAL ML/MIN/{1.73_M2}
GFR SERPL CREATININE-BSD FRML MDRD: NORMAL ML/MIN/{1.73_M2}
GLUCOSE BLD-MCNC: 88 MG/DL (ref 70–99)
HDLC SERPL-MCNC: 36 MG/DL
LDL CHOLESTEROL: 158 MG/DL (ref 0–130)
POTASSIUM SERPL-SCNC: 4.3 MMOL/L (ref 3.7–5.3)
SODIUM BLD-SCNC: 139 MMOL/L (ref 135–144)
TRIGL SERPL-MCNC: 169 MG/DL
VLDLC SERPL CALC-MCNC: ABNORMAL MG/DL (ref 1–30)

## 2020-02-20 PROCEDURE — 36415 COLL VENOUS BLD VENIPUNCTURE: CPT

## 2020-02-20 PROCEDURE — 80048 BASIC METABOLIC PNL TOTAL CA: CPT

## 2020-02-20 PROCEDURE — 80061 LIPID PANEL: CPT

## 2020-03-17 ENCOUNTER — OFFICE VISIT (OUTPATIENT)
Dept: FAMILY MEDICINE CLINIC | Age: 50
End: 2020-03-17
Payer: COMMERCIAL

## 2020-03-17 VITALS
OXYGEN SATURATION: 98 % | HEIGHT: 77 IN | HEART RATE: 105 BPM | BODY MASS INDEX: 30.34 KG/M2 | DIASTOLIC BLOOD PRESSURE: 52 MMHG | WEIGHT: 257 LBS | SYSTOLIC BLOOD PRESSURE: 90 MMHG

## 2020-03-17 PROCEDURE — 99213 OFFICE O/P EST LOW 20 MIN: CPT | Performed by: FAMILY MEDICINE

## 2020-03-17 RX ORDER — CITALOPRAM 20 MG/1
20 TABLET ORAL 2 TIMES DAILY
Qty: 180 TABLET | Refills: 1 | Status: SHIPPED | OUTPATIENT
Start: 2020-03-17 | End: 2020-09-14

## 2020-03-17 ASSESSMENT — ENCOUNTER SYMPTOMS
SHORTNESS OF BREATH: 1
NAUSEA: 0
BACK PAIN: 0
ABDOMINAL PAIN: 0
VOMITING: 0

## 2020-03-17 NOTE — PROGRESS NOTES
current use of anticoagulant therapy 5/30/2017     Updating Deprecated Diagnoses    Near syncope 12/14/2015    MICHELLE (obstructive sleep apnea)     Otitis externa 5/30/2017    PE (pulmonary thromboembolism) ([facility] [address] 05/25/2017    Bilateral PEs     Pulmonary embolism [facility])     [facility] - ? post procedural    Pulmonary embolus ([facility] [address] 5/30/2017    Syncope 12/14/2015    Overview:  S/p Biotronik pacemaker 2014    TIA (transient ischemic attack) 09/2016     Past Surgical History:   Procedure Laterality Date    MALIGNANT SKIN LESION EXCISION  2012    PACEMAKER INSERTION  09/2014    VASECTOMY  09/2014     Social History     Socioeconomic History    Marital status:      Spouse name: Not on file    Number of children: Not on file    Years of education: Not on file    Highest education level: Not on file   Occupational History    Not on file   Social Needs    Financial resource strain: Not on file    Food insecurity     Worry: Not on file     Inability: Not on file    Transportation needs     Medical: Not on file     Non-medical: Not on file   Tobacco Use    Smoking status: Never Smoker    Smokeless tobacco: Never Used   Substance and Sexual Activity    Alcohol use: No    Drug use: Not on file    Sexual activity: Not on file   Lifestyle    Physical activity     Days per week: Not on file     Minutes per session: Not on file    Stress: Not on file   Relationships    Social connections     Talks on phone: Not on file     Gets together: Not on file     Attends Latter-day service: Not on file     Active member of club or organization: Not on file     Attends meetings of clubs or organizations: Not on file     Relationship status: Not on file    Intimate partner violence     Fear of current or ex partner: Not on file     Emotionally abused: Not on file     Physically abused: Not on file     Forced sexual activity: Not on file   Other Topics Concern    Not on file   Social History Narrative   

## 2020-06-23 ENCOUNTER — OFFICE VISIT (OUTPATIENT)
Dept: FAMILY MEDICINE CLINIC | Age: 50
End: 2020-06-23
Payer: COMMERCIAL

## 2020-06-23 VITALS
DIASTOLIC BLOOD PRESSURE: 70 MMHG | HEIGHT: 77 IN | SYSTOLIC BLOOD PRESSURE: 100 MMHG | OXYGEN SATURATION: 98 % | HEART RATE: 79 BPM | BODY MASS INDEX: 29.87 KG/M2 | WEIGHT: 253 LBS

## 2020-06-23 PROCEDURE — 99213 OFFICE O/P EST LOW 20 MIN: CPT | Performed by: FAMILY MEDICINE

## 2020-06-23 RX ORDER — CETIRIZINE HYDROCHLORIDE 10 MG/1
10 TABLET ORAL DAILY
Qty: 30 TABLET | Refills: 0 | Status: SHIPPED | OUTPATIENT
Start: 2020-06-23 | End: 2020-07-13

## 2020-06-23 RX ORDER — LORAZEPAM 0.5 MG/1
0.5 TABLET ORAL 2 TIMES DAILY PRN
Qty: 10 TABLET | Refills: 0 | Status: SHIPPED | OUTPATIENT
Start: 2020-06-23 | End: 2020-07-23

## 2020-06-23 ASSESSMENT — ENCOUNTER SYMPTOMS
CHEST TIGHTNESS: 1
SHORTNESS OF BREATH: 0

## 2020-06-23 NOTE — PROGRESS NOTES
[address] [facility]  Dept: [phone]  Dept Fax:[phone]      Madina Jeffries is a 51 y.o. male who presents today for his medical conditions/complaints as noted below. Chief Complaint   Patient presents with    Loss of Consciousness       HPI:     HPI   Patient reports on Sunday he had gone to before while doing something that made him feel very itchy and feel bad and he had an episode of syncope following that. No throat tightness/swelling, no breathing difficulty. Patient did report some chest tightness palpitations weakness and dizziness. Last couple days. This did feel different than his usual neurocardiogenic syncope. No fevers, no hives. Reports still awoke last couple days with persisting fatigue, lightheadedness and palpitaions. Numbness in arms and legs. Slight HA, slight nausea. Prior to Visit Medications    Medication Sig Taking?  Authorizing Provider   citalopram (CELEXA) 20 MG tablet Take 1 tablet by mouth 2 times daily  Jerome Mondragon MD   omeprazole (PRILOSEC) 20 MG delayed release capsule TAKE 1 CAPSULE TWICE A DAY  ALBARO Clark - CNP   Droxidopa (NORTHERA) 100 MG CAPS Take by mouth 3 times daily as needed  Historical Provider, MD       Allergies   Allergen Reactions    Adhesive Tape     Azithromycin     Erythromycin      Hand swelling    Influenza Vaccines      SVT    Lipitor [Atorvastatin]      bruising    Nitroglycerin      Syncopal episode    Nubain [Nalbuphine]      Hand swelling    Other      Adhesive tape , skin tear     Valium [Diazepam]      Does not like how he felt       Past Medical History:   Diagnosis Date    Heart palpitations     Long term current use of anticoagulant therapy 5/30/2017     Updating Deprecated Diagnoses    Near syncope 12/14/2015    MICHELLE (obstructive sleep apnea)     Otitis externa 5/30/2017    [facility]    Bilateral PEs     Pulmonary embolism [facility])     [facility] - ? post procedural    Pulmonary embolus ([address] 5/30/2017    Syncope 12/14/2015    Overview:  S/p Biotronik pacemaker 2014    TIA (transient ischemic attack) 09/2016     Past Surgical History:   Procedure Laterality Date    MALIGNANT SKIN LESION EXCISION  2012    PACEMAKER INSERTION  09/2014    VASECTOMY  09/2014     Social History     Socioeconomic History    Marital status:      Spouse name: Not on file    Number of children: Not on file    Years of education: Not on file    Highest education level: Not on file   Occupational History    Not on file   Social Needs    Financial resource strain: Not on file    Food insecurity     Worry: Not on file     Inability: Not on file   [employer] needs     Medical: Not on file     Non-medical: Not on file   Tobacco Use    Smoking status: Never Smoker    Smokeless tobacco: Never Used   Substance and Sexual Activity    Alcohol use: No    Drug use: Not on file    Sexual activity: Not on file   Lifestyle    Physical activity     Days per week: Not on file     Minutes per session: Not on file    Stress: Not on file   Relationships    Social connections     Talks on phone: Not on file     Gets together: Not on file     Attends Pentecostal service: Not on file     Active member of club or organization: Not on file     Attends meetings of clubs or organizations: Not on file     Relationship status: Not on file    Intimate partner violence     Fear of current or ex partner: Not on file     Emotionally abused: Not on file     Physically abused: Not on file     Forced sexual activity: Not on file   Other Topics Concern    Not on file   Social History Narrative    Not on file     Family History   Problem Relation Age of Onset    Diabetes Mother     Cancer Brother         Bone Cancer Osteocarcinoma 1991       Subjective:      Review of Systems   Constitutional: Negative for fever.         Sunday went to [employer] and ate something that made him itch really bad than made him has a syncope episode. Respiratory: Positive for chest tightness. Negative for shortness of breath. Cardiovascular: Positive for palpitations. Negative for chest pain and leg swelling. Neurological: Positive for dizziness, syncope, weakness, light-headedness, numbness (in hands) and headaches (just sinus headaches). Negative for tremors and speech difficulty. Psychiatric/Behavioral: Negative for confusion. The patient is not nervous/anxious. Objective:     /70 (Site: Left Upper Arm, Position: Sitting, Cuff Size: Large Adult)   Pulse 79   Ht 6' 5.01\" (1.956 m)   Wt 253 lb (114.8 kg)   SpO2 98%   BMI 30.00 kg/m²     Physical Exam  Constitutional:       General: He is not in acute distress. Appearance: Normal appearance. He is not ill-appearing or toxic-appearing. HENT:      Head: Normocephalic. Eyes:      Conjunctiva/sclera: Conjunctivae normal.   Cardiovascular:      Rate and Rhythm: Normal rate and regular rhythm. Heart sounds: No murmur. Pulmonary:      Effort: Pulmonary effort is normal. No respiratory distress. Breath sounds: No wheezing. Musculoskeletal:         General: No swelling. Neurological:      Mental Status: He is alert. Psychiatric:         Thought Content: Thought content normal.         Judgment: Judgment normal.         Assessment:      Diagnosis Orders   1. Food intolerance in adult     2. Neurocardiogenic syncope  LORazepam (ATIVAN) 0.5 MG tablet     No results found for this visit on 06/23/20.    :     No follow-ups on file. Patient Instructions   May have note for last 2 days for work  ENcourage use of zyrtec as preferred to loratadine by pt for next several days. Trial of lorazepam to see if palpitations and other symptoms improve. No orders of the defined types were placed in this encounter. Patient was seen with total face to face time of 20 minutes.  More than 50%   of this visit was counseling and education regarding complex care as well   as counseling on need for specialty follow-up given severity of symptoms.     Electronically signed by Patricia Geller MD on 6/23/2020 at10:39 PM

## 2020-07-11 ENCOUNTER — VIRTUAL VISIT (OUTPATIENT)
Dept: FAMILY MEDICINE CLINIC | Age: 50
End: 2020-07-11
Payer: COMMERCIAL

## 2020-07-11 PROCEDURE — G2012 BRIEF CHECK IN BY MD/QHP: HCPCS | Performed by: FAMILY MEDICINE

## 2020-07-11 NOTE — PROGRESS NOTES
Jonas Cat is a 51 y.o. male evaluated via telephone on 7/11/2020. Consent:  He and/or health care decision maker is aware that that he may receive a bill for this telephone service, depending on his insurance coverage, and has provided verbal consent to proceed: Yes      Documentation:  I communicated with the patient and/or health care decision maker about possible covid exposure. Details of this discussion including any medical advice provided: A coworker came into his office  Yesterday had SOB, shaking but this is related to his cardiogenic episode,. Does have a ST, had some coughing yesterday but minimal. No fever. With the cardiogenic syncope does sweat a lot and this was similar. Has mild Runny nose and nasal congestion. No GI symptoms at all except nausea last night. No new body aches. No fever or chills. Advised to self quarantine for 14 days. May work but only if no exposure to others/ no shared work space. Test order sent to [facility] lab and patient notified of testing process as well as s/sx to call for reevaluation. I affirm this is a Patient Initiated Episode with a Patient who has not had a related appointment within my department in the past 7 days or scheduled within the next 24 hours.     Patient identification was verified at the start of the visit: Yes    Total Time: minutes: 5-10 minutes    Note: not billable if this call serves to triage the patient into an appointment for the relevant concern      Tri Boyd

## 2020-08-04 ENCOUNTER — VIRTUAL VISIT (OUTPATIENT)
Dept: FAMILY MEDICINE CLINIC | Age: 50
End: 2020-08-04
Payer: COMMERCIAL

## 2020-08-04 PROCEDURE — 99213 OFFICE O/P EST LOW 20 MIN: CPT | Performed by: FAMILY MEDICINE

## 2020-08-04 ASSESSMENT — ENCOUNTER SYMPTOMS
COUGH: 0
WHEEZING: 0
SINUS PRESSURE: 1
BACK PAIN: 0
CHEST TIGHTNESS: 1
VOMITING: 0
NAUSEA: 0
SINUS PAIN: 1
SORE THROAT: 1
ABDOMINAL PAIN: 0
SHORTNESS OF BREATH: 0

## 2020-08-04 NOTE — PROGRESS NOTES
[address] [facility]  Dept: [phone]  Dept Fax:[phone]      Hilario Senior is a 53 y.o. male who presents today for his medical conditions/complaints as noted below. Chief Complaint   Patient presents with    Covid Testing       HPI:     HPI  Video visit with pt isabella. me with pt at home and me at [facility] office. Pt here to review need for covid testing returning from vacation in [place]. Work asking to get tested prior to return. Noted 3 days mild sore throat and ear pain. Returned on Friday after a week stay. No fever no change in taste /smell. Spouse exposed at work also and recently tested also. Prior to Visit Medications    Medication Sig Taking?  Authorizing Provider   citalopram (CELEXA) 20 MG tablet Take 1 tablet by mouth 2 times daily  Petar Flores MD   omeprazole (PRILOSEC) 20 MG delayed release capsule TAKE 1 CAPSULE TWICE A DAY  Janeece Favre, APRN - CNP   Droxidopa (NORTHERA) 100 MG CAPS Take by mouth 3 times daily as needed  Historical Provider, MD       Allergies   Allergen Reactions    Adhesive Tape     Azithromycin     Erythromycin      Hand swelling    Influenza Vaccines      SVT    Lipitor [Atorvastatin]      bruising    Nitroglycerin      Syncopal episode    Nubain [Nalbuphine]      Hand swelling    Other      Adhesive tape , skin tear     Valium [Diazepam]      Does not like how he felt       Past Medical History:   Diagnosis Date    Heart palpitations     Long term current use of anticoagulant therapy 5/30/2017     Updating Deprecated Diagnoses    Near syncope 12/14/2015    MICHELLE (obstructive sleep apnea)     Otitis externa 5/30/2017    PE (pulmonary thromboembolism) ([facility] [address] 05/25/2017    Bilateral PEs     Pulmonary embolism [facility] - ? post procedural    Pulmonary embolus ([address] 5/30/2017    Syncope 12/14/2015    Overview:  S/p Biotronik pacemaker 2014    TIA (transient ischemic attack) 09/2016     Past Surgical History:   Procedure Laterality Date    MALIGNANT SKIN LESION EXCISION  2012    PACEMAKER INSERTION  09/2014    VASECTOMY  09/2014     Social History     Socioeconomic History    Marital status:      Spouse name: Not on file    Number of children: Not on file    Years of education: Not on file    Highest education level: Not on file   Occupational History    Not on file   Social Needs    Financial resource strain: Not on file    Food insecurity     Worry: Not on file     Inability: Not on file    Transportation needs     Medical: Not on file     Non-medical: Not on file   Tobacco Use    Smoking status: Never Smoker    Smokeless tobacco: Never Used   Substance and Sexual Activity    Alcohol use: No    Drug use: Not on file    Sexual activity: Not on file   Lifestyle    Physical activity     Days per week: Not on file     Minutes per session: Not on file    Stress: Not on file   Relationships    Social connections     Talks on phone: Not on file     Gets together: Not on file     Attends Jainism service: Not on file     Active member of club or organization: Not on file     Attends meetings of clubs or organizations: Not on file     Relationship status: Not on file    Intimate partner violence     Fear of current or ex partner: Not on file     Emotionally abused: Not on file     Physically abused: Not on file     Forced sexual activity: Not on file   Other Topics Concern    Not on file   Social History Narrative    Not on file     Family History   Problem Relation Age of Onset    Diabetes Mother     Cancer Brother         Bone Cancer Osteocarcinoma 1991       Subjective:      Review of Systems   Constitutional: Negative for fatigue and fever. Pt was recently in [place] and needs to be tested for covid to return back to work. He has been tested for covid before and it was negative.  But since he went out of state his work wants him tested. Wife also was tested the other day because she was in contact with someone who was tested positive for covid. HENT: Positive for ear pain, sinus pressure, sinus pain and sore throat. Negative for congestion and sneezing. Respiratory: Positive for chest tightness (yesterday had a very tight chest). Negative for cough, shortness of breath and wheezing. Cardiovascular: Negative for chest pain. Gastrointestinal: Negative for abdominal pain, nausea and vomiting. Musculoskeletal: Negative for back pain. Neurological: Negative for dizziness and headaches. Objective: There were no vitals taken for this visit. Physical Exam  Constitutional:       General: He is not in acute distress. Appearance: Normal appearance. He is not ill-appearing or toxic-appearing. HENT:      Head: Normocephalic. Eyes:      Conjunctiva/sclera: Conjunctivae normal.   Pulmonary:      Effort: Pulmonary effort is normal. No respiratory distress. Neurological:      Mental Status: He is alert. Psychiatric:         Mood and Affect: Mood normal.         Judgment: Judgment normal.         Assessment:      Diagnosis Orders   1. Close exposure to COVID-19 virus  COVID-19 Ambulatory     No results found for this visit on 08/04/20.    :     No follow-ups on file. Patient Instructions   May be cleared to return to work after testing returns as negative. Orders Placed This Encounter   Procedures    COVID-19 Ambulatory     Tested last month, following work exposure negative. Standing Status:   Future     Standing Expiration Date:   2/4/2021     Scheduling Instructions:      Saline media preferred given current shortage of viral transport media but both acceptable     Order Specific Question:   Is this test for diagnosis or screening? Answer:   Screening     Order Specific Question:   Symptomatic for COVID-19 as defined by CDC?      Answer:   No     Order Specific Question:   Reason for Test

## 2020-09-08 ENCOUNTER — VIRTUAL VISIT (OUTPATIENT)
Dept: FAMILY MEDICINE CLINIC | Age: 50
End: 2020-09-08
Payer: COMMERCIAL

## 2020-09-08 LAB — STREP A DIRECT SCREEN: NEGATIVE

## 2020-09-08 PROCEDURE — 99213 OFFICE O/P EST LOW 20 MIN: CPT | Performed by: FAMILY MEDICINE

## 2020-09-08 ASSESSMENT — ENCOUNTER SYMPTOMS
SORE THROAT: 0
COUGH: 0
WHEEZING: 0
SHORTNESS OF BREATH: 0

## 2020-09-08 NOTE — PROGRESS NOTES
[address] [address]  Dept: [phone]  Dept Fax: [phone]    Floyd Mohan is a 53 y.o. male who presents today for his medical conditions/complaints as noted below. Floyd Mohan c/o of Letter for School/Work (off today due to headache needs note to return to work)      HPI:     HPI  Video visit with pt isabella. me with pt at home and me at [address] [place] [address] office. Pt contacting for return to work note. Had been off today for headache. Sore throat and sinus feeling last night. 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 Denies fever, contact with others ill or loss of taste or smell    BP Readings from Last 3 Encounters:   06/23/20 100/70   03/17/20 (!) 90/52   02/12/20 112/70          (goal 120/80)    Past Medical History:   Diagnosis Date    Heart palpitations     Long term current use of anticoagulant therapy 5/30/2017     Updating Deprecated Diagnoses    Near syncope 12/14/2015    MICHELLE (obstructive sleep apnea)     Otitis externa 5/30/2017    PE (pulmonary thromboembolism) ([facility] [address] 05/25/2017    Bilateral PEs     Pulmonary embolism [facility])     [facility] - ? post procedural    Pulmonary embolus ([facility] [address] 5/30/2017    Syncope 12/14/2015    Overview:  S/p Biotronik pacemaker 2014    TIA (transient ischemic attack) 09/2016      Past Surgical History:   Procedure Laterality Date    MALIGNANT SKIN LESION EXCISION  2012    PACEMAKER INSERTION  09/2014    VASECTOMY  09/2014       Family History   Problem Relation Age of Onset    Diabetes Mother     Cancer Brother         Bone Cancer Osteocarcinoma 1991       Social History     Tobacco Use    Smoking status: Never Smoker    Smokeless tobacco: Never Used   Substance Use Topics    Alcohol use: No      Prior to Visit Medications    Medication Sig Taking?  Authorizing Provider   citalopram (CELEXA) 20 MG tablet Take 1 tablet by mouth 2 times daily  Janes Mcodnnell MD   omeprazole (PRILOSEC) 20 MG delayed release capsule TAKE 1 CAPSULE TWICE A DAY  Kayla Saunders, APRN - CNP   Droxidopa (NORTHERA) 100 MG CAPS Take by mouth 3 times daily as needed  Historical Provider, MD     Allergies   Allergen Reactions    Adhesive Tape     Azithromycin     Erythromycin      Hand swelling    Influenza Vaccines      SVT    Lipitor [Atorvastatin]      bruising    Nitroglycerin      Syncopal episode    Nubain [Nalbuphine]      Hand swelling    Other      Adhesive tape , skin tear     Valium [Diazepam]      Does not like how he felt       Health Maintenance   Topic Date Due    HIV screen  07/27/1986    DTaP/Tdap/Td vaccine (1 - Tdap) 07/27/1990    Lipid screen  02/20/2025    Hepatitis A vaccine  Aged Out    Hepatitis B vaccine  Aged Out    Hib vaccine  Aged Out    Meningococcal (ACWY) vaccine  Aged Out    Pneumococcal 0-64 years Vaccine  Aged Out       Subjective:      Review of Systems   Constitutional: Negative for appetite change, fatigue and fever. HENT: Negative for congestion and sore throat. Respiratory: Negative for cough, shortness of breath and wheezing. Cardiovascular: Negative for chest pain. Neurological: Positive for headaches. Negative for dizziness. Objective: There were no vitals taken for this visit. Physical Exam  Vitals signs reviewed. Constitutional:       General: He is not in acute distress. Appearance: He is ill-appearing ( mild). He is not toxic-appearing. HENT:      Head: Normocephalic. Eyes:      Conjunctiva/sclera: Conjunctivae normal.   Pulmonary:      Effort: Pulmonary effort is normal. No respiratory distress. Neurological:      Mental Status: He is alert. Psychiatric:         Thought Content: Thought content normal.         Judgment: Judgment normal.         Assessment:     1. Viral illness      No results found for this visit on 09/08/20.         Plan:     Orders Placed This Encounter Procedures    COVID-19 Ambulatory     Standing Status:   Future     Standing Expiration Date:   9/8/2021     Scheduling Instructions:      Saline media preferred given current shortage of viral transport media but both acceptable     Order Specific Question:   Is this test for diagnosis or screening? Answer:   Diagnosis of ill patient     Order Specific Question:   Symptomatic for COVID-19 as defined by CDC? Answer:   Yes     Order Specific Question:   Date of Symptom Onset     Answer:   9/7/2020     Order Specific Question:   Hospitalized for COVID-19? Answer:   No     Order Specific Question:   Admitted to ICU for COVID-19? Answer:   No     Order Specific Question:   Employed in healthcare setting? Answer:   No     Order Specific Question:   Resident in a congregate (group) care setting? Answer:   No     Order Specific Question:   Pregnant: Answer:   No     Order Specific Question:   Previously tested for COVID-19? Answer: Yes    POCT rapid strep A       No follow-ups on file. Patient Instructions   Increase fluids except coffee, tea and alcohol. May use tylenol every 4-6 hours or ibuprofen every 8 hours as needed for comfort. If severe SOB or other breathing issues encourage to present to [facility] other ill contacts to act as though they have covid until testing returns. Reviewed if positive 10 day quarantine from beginning of symptoms as long as fever not present off medication and other symptoms improving  May return Monday after testing negative or by Thursday next week even if testing positive. Due to this being a TeleHealth encounter (During KRFVT-15 public health emergency), evaluation of the following organ systems was limited: Vitals/Constitutional/EENT/Resp/CV/GI//MS/Neuro/Skin/Heme-Lymph-Imm.   Pursuant to the emergency declaration under the [address] [facility] waiver authority and the Coronavirus Preparedness and Response Supplemental Appropriations Act, this Virtual Visit was conducted with patient's consent, to reduce the patient's risk of exposure to COVID-19 and provide necessary medical care. Services were provided through a video synchronous discussion virtually to substitute for in-person clinic visit. Patient located at their individual home. All patient questions answered. Pt voice concerns. Patient agreed with treatment plan. Follow up as directed.      Electronically signed by Jeevan Bourgeois MD on 9/8/2020

## 2020-09-08 NOTE — PATIENT INSTRUCTIONS
Increase fluids except coffee, tea and alcohol. May use tylenol every 4-6 hours or ibuprofen every 8 hours as needed for comfort. If severe SOB or other breathing issues encourage to present to [facility] in [place] other ill contacts to act as though they have covid until testing returns. Reviewed if positive 10 day quarantine from beginning of symptoms as long as fever not present off medication and other symptoms improving  May return Monday after testing negative or by Thursday next week even if testing positive.

## 2020-09-10 ENCOUNTER — TELEPHONE (OUTPATIENT)
Dept: FAMILY MEDICINE CLINIC | Age: 50
End: 2020-09-10

## 2020-09-10 NOTE — TELEPHONE ENCOUNTER
Called in regards to Covid results, notified that they are not back yet. He would like to know if there is an ATB or something else that you suggest he could try taking due to sx worsening.

## 2020-09-28 ENCOUNTER — TELEPHONE (OUTPATIENT)
Dept: FAMILY MEDICINE CLINIC | Age: 50
End: 2020-09-28

## 2020-09-28 ENCOUNTER — HOSPITAL ENCOUNTER (OUTPATIENT)
Age: 50
Setting detail: SPECIMEN
Discharge: HOME | End: 2020-09-28
Payer: COMMERCIAL

## 2020-09-28 LAB
ABSOLUTE EOS #: 0.39 K/UL (ref 0–0.44)
ABSOLUTE IMMATURE GRANULOCYTE: <0.03 K/UL (ref 0–0.3)
ABSOLUTE LYMPH #: 3.04 K/UL (ref 1.1–3.7)
ABSOLUTE MONO #: 0.51 K/UL (ref 0.1–1.2)
BASOPHILS # BLD: 1 % (ref 0–2)
BASOPHILS ABSOLUTE: 0.1 K/UL (ref 0–0.2)
DIFFERENTIAL TYPE: ABNORMAL
EOSINOPHILS RELATIVE PERCENT: 5 % (ref 1–4)
HCT VFR BLD CALC: 45.5 % (ref 40.7–50.3)
HEMOGLOBIN: 14.8 G/DL (ref 13–17)
IMMATURE GRANULOCYTES: 0 %
LYMPHOCYTES # BLD: 40 % (ref 24–43)
MCH RBC QN AUTO: 27.6 PG (ref 25.2–33.5)
MCHC RBC AUTO-ENTMCNC: 32.5 G/DL (ref 25.2–33.5)
MCV RBC AUTO: 84.9 FL (ref 82.6–102.9)
MONOCYTES # BLD: 7 % (ref 3–12)
NRBC AUTOMATED: 0 PER 100 WBC
PDW BLD-RTO: 12.8 % (ref 11.8–14.4)
PLATELET # BLD: 271 K/UL (ref 138–453)
PLATELET ESTIMATE: ABNORMAL
PMV BLD AUTO: 10.9 FL (ref 8.1–13.5)
RBC # BLD: 5.36 M/UL (ref 4.21–5.77)
RBC # BLD: ABNORMAL 10*6/UL
SEG NEUTROPHILS: 47 % (ref 36–65)
SEGMENTED NEUTROPHILS ABSOLUTE COUNT: 3.63 K/UL (ref 1.5–8.1)
WBC # BLD: 7.7 K/UL (ref 3.5–11.3)
WBC # BLD: ABNORMAL 10*3/UL

## 2020-09-28 PROCEDURE — 85025 COMPLETE CBC W/AUTO DIFF WBC: CPT

## 2020-09-28 PROCEDURE — 36415 COLL VENOUS BLD VENIPUNCTURE: CPT

## 2020-09-28 NOTE — TELEPHONE ENCOUNTER
Pt may get CXR and CBC and have follow up visit once these have returned to evaluate for evidence of a bacterial infection which could need treated with an antibiotic.   Thanks

## 2020-09-28 NOTE — TELEPHONE ENCOUNTER
covid was negative, evaluating from message today for persistant cough for any evidence of bacterial infection.   Thanks

## 2020-09-28 NOTE — TELEPHONE ENCOUNTER
Patient went and had lab and xray done. Would like to know if he can get a note for work.   Waiting for results to make appt to see you

## 2020-09-29 ENCOUNTER — VIRTUAL VISIT (OUTPATIENT)
Dept: FAMILY MEDICINE CLINIC | Age: 50
End: 2020-09-29
Payer: COMMERCIAL

## 2020-09-29 PROCEDURE — 99213 OFFICE O/P EST LOW 20 MIN: CPT | Performed by: FAMILY MEDICINE

## 2020-09-29 RX ORDER — MONTELUKAST SODIUM 10 MG/1
10 TABLET ORAL DAILY
Qty: 30 TABLET | Refills: 5 | Status: SHIPPED | OUTPATIENT
Start: 2020-09-29 | End: 2021-08-23 | Stop reason: ALTCHOICE

## 2020-09-29 RX ORDER — MELOXICAM 15 MG/1
15 TABLET ORAL DAILY PRN
Qty: 30 TABLET | Refills: 0 | Status: SHIPPED | OUTPATIENT
Start: 2020-09-29 | End: 2021-08-23 | Stop reason: ALTCHOICE

## 2020-09-29 ASSESSMENT — ENCOUNTER SYMPTOMS
CHEST TIGHTNESS: 1
SHORTNESS OF BREATH: 1
COUGH: 1

## 2020-09-29 NOTE — PATIENT INSTRUCTIONS
Encourage continuing meloxicam for 3 days after chest discomfort gone completely  Work note to return tomorrow for pt

## 2020-09-29 NOTE — PROGRESS NOTES
Otitis externa 5/30/2017    PE (pulmonary thromboembolism) ([facility] [address] 05/25/2017    Bilateral PEs     Pulmonary embolism [facility])     [facility] - ? post procedural    Pulmonary embolus ([facility] [address] 5/30/2017    Syncope 12/14/2015    Overview:  S/p Biotronik pacemaker 2014    TIA (transient ischemic attack) 09/2016     Past Surgical History:   Procedure Laterality Date    MALIGNANT SKIN LESION EXCISION  2012    PACEMAKER INSERTION  09/2014    VASECTOMY  09/2014     Social History     Socioeconomic History    Marital status:      Spouse name: Not on file    Number of children: Not on file    Years of education: Not on file    Highest education level: Not on file   Occupational History    Not on file   Social Needs    Financial resource strain: Not on file    Food insecurity     Worry: Not on file     Inability: Not on file    Transportation needs     Medical: Not on file     Non-medical: Not on file   Tobacco Use    Smoking status: Never Smoker    Smokeless tobacco: Never Used   Substance and Sexual Activity    Alcohol use: No    Drug use: Not on file    Sexual activity: Not on file   Lifestyle    Physical activity     Days per week: Not on file     Minutes per session: Not on file    Stress: Not on file   Relationships    Social connections     Talks on phone: Not on file     Gets together: Not on file     Attends Buddhism service: Not on file     Active member of club or organization: Not on file     Attends meetings of clubs or organizations: Not on file     Relationship status: Not on file    Intimate partner violence     Fear of current or ex partner: Not on file     Emotionally abused: Not on file     Physically abused: Not on file     Forced sexual activity: Not on file   Other Topics Concern    Not on file   Social History Narrative    Not on file     Family History   Problem Relation Age of Onset    Diabetes Mother    [facility] Subjective:      Review of Systems   Constitutional: Negative for fatigue and fever. Respiratory: Positive for cough, chest tightness and shortness of breath. Cardiovascular: Positive for chest pain (hurts worse when he takes a deep breathe). Objective: There were no vitals taken for this visit. Physical Exam  Constitutional:       General: He is not in acute distress. Appearance: Normal appearance. He is not toxic-appearing. HENT:      Head: Normocephalic. Eyes:      Conjunctiva/sclera: Conjunctivae normal.   Pulmonary:      Effort: Pulmonary effort is normal. No respiratory distress. Neurological:      Mental Status: He is alert. Psychiatric:         Thought Content: Thought content normal.         Judgment: Judgment normal.       Lab Results   Component Value Date    WBC 7.7 09/28/2020    HGB 14.8 09/28/2020    HCT 45.5 09/28/2020    MCV 84.9 09/28/2020     09/28/2020     CXR with no acute chest disease. Assessment:      Diagnosis Orders   1. Cough  montelukast (SINGULAIR) 10 MG tablet   2. Dysthymic disorder     3. Pleuritic chest pain  meloxicam (MOBIC) 15 MG tablet     No results found for this visit on 09/29/20.    :     Return if symptoms worsen or fail to improve. Patient Instructions   Encourage continuing meloxicam for 3 days after chest discomfort gone completely  Work note to return tomorrow for pt      No orders of the defined types were placed in this encounter. Due to this being a TeleHealth encounter (During FLOGA-82 public health emergency), evaluation of the following organ systems was limited: Vitals/Constitutional/EENT/Resp/CV/GI//MS/Neuro/Skin/Heme-Lymph-Imm.   Pursuant to the emergency declaration under the [address] [facility] [address] [place] [address] authority and the [employer] Act, this Virtual Visit was conducted with patient's consent, to reduce the patient's risk of exposure to COVID-19 and provide necessary medical care. Services were provided through a video synchronous discussion virtually to substitute for in-person clinic visit. Patient located at their individual home.     Electronically signed by Abi Gonzalez MD on 9/29/2020 at10:49 PM

## 2020-10-16 ENCOUNTER — TELEPHONE (OUTPATIENT)
Dept: FAMILY MEDICINE CLINIC | Age: 50
End: 2020-10-16

## 2020-10-16 NOTE — TELEPHONE ENCOUNTER
TONY  States I was seen a couple of months ago and had a negative covid test, I was still having a lot of chest discomfort and coughing. Now I am getting really concerned because I am having a very hard time breathing and taking a deep breath. When I cough stuff comes up and I cant hardly talk very long. Writer advised that pcp is out on fridays and that providers at [facility] were out today as well. Advised if he was having difficulty breathing and he was concerned he should go to the ED for evaluation, he agreed stating Ill get myself ready and go.

## 2020-11-05 ENCOUNTER — VIRTUAL VISIT (OUTPATIENT)
Dept: FAMILY MEDICINE CLINIC | Age: 50
End: 2020-11-05
Payer: COMMERCIAL

## 2020-11-05 PROCEDURE — 99213 OFFICE O/P EST LOW 20 MIN: CPT | Performed by: FAMILY MEDICINE

## 2020-11-05 ASSESSMENT — ENCOUNTER SYMPTOMS
VOMITING: 0
ABDOMINAL PAIN: 0
COUGH: 0
SHORTNESS OF BREATH: 0
DIARRHEA: 0
NAUSEA: 0

## 2020-11-05 NOTE — PROGRESS NOTES
[address] [facility]  Dept: [phone]  Dept Fax:[phone]      Geovanna Oliver is a 53 y.o. male who presents today for his medical conditions/complaints as noted below. Chief Complaint   Patient presents with    Covid Testing       HPI:     HPI  Video visit with pt isabella. me with pt at home and me at [facility] office. Pt had recurrent illness couple weeks ago. Patient reports he was sent home from work today for body aches and sweats has complained of no taste starting this morning he has had no fevers at this time. Chills and fatigue have been present as well as headache. Patient had been tested previously for Covid a couple times without any positives up to this time. Fall allergies are previously also noted and patient is continue take his Singulair daily    Had symptoms noted couple weeks ago and then cough improved and felt better until Friday  Goes to multiple facilities as HR,   Contact with covid , was wearing mask and stayed 6 feet apart. This am severe body aches and couldn't taste coffee. Feels like early flu      Prior to Visit Medications    Medication Sig Taking?  Authorizing Provider   meloxicam (MOBIC) 15 MG tablet Take 1 tablet by mouth daily as needed for Pain (pleuritic pain)  Brian Blackmon MD   montelukast (SINGULAIR) 10 MG tablet Take 1 tablet by mouth daily  Abby Kaufman MD   citalopram (CELEXA) 20 MG tablet TAKE 1 TABLET TWICE A DAY (NEW DOSE)  Abby Kaufman MD   omeprazole (PRILOSEC) 20 MG delayed release capsule TAKE 1 CAPSULE TWICE A DAY  Gwendolyn Castellanos APRN - CNP   Droxidopa (NORTHERA) 100 MG CAPS Take by mouth 3 times daily as needed  Historical Provider, MD       Allergies   Allergen Reactions    Adhesive Tape     Azithromycin     Erythromycin      Hand swelling    Influenza Vaccines      SVT    Lipitor [Atorvastatin]      bruising    Nitroglycerin      Syncopal episode    Nubain [Nalbuphine] Hand swelling    Other      Adhesive tape , skin tear     Valium [Diazepam]      Does not like how he felt       Past Medical History:   Diagnosis Date    Heart palpitations     Long term current use of anticoagulant therapy 5/30/2017     Updating Deprecated Diagnoses    Near syncope 12/14/2015    MICHELLE (obstructive sleep apnea)     Otitis externa 5/30/2017    PE (pulmonary thromboembolism) ([facility] [address] 05/25/2017    Bilateral PEs     Pulmonary embolism [facility])     [facility] - ? post procedural    Pulmonary embolus ([facility] [address] 5/30/2017    Syncope 12/14/2015    Overview:  S/p Biotronik pacemaker 2014    TIA (transient ischemic attack) 09/2016     Past Surgical History:   Procedure Laterality Date    MALIGNANT SKIN LESION EXCISION  2012    PACEMAKER INSERTION  09/2014    VASECTOMY  09/2014     Social History     Socioeconomic History    Marital status:      Spouse name: Not on file    Number of children: Not on file    Years of education: Not on file    Highest education level: Not on file   Occupational History    Not on file   Social Needs    Financial resource strain: Not on file    Food insecurity     Worry: Not on file     Inability: Not on file    Transportation needs     Medical: Not on file     Non-medical: Not on file   Tobacco Use    Smoking status: Never Smoker    Smokeless tobacco: Never Used   Substance and Sexual Activity    Alcohol use: No    Drug use: Not on file    Sexual activity: Not on file   Lifestyle    Physical activity     Days per week: Not on file     Minutes per session: Not on file    Stress: Not on file   Relationships    Social connections     Talks on phone: Not on file     Gets together: Not on file     Attends Sabianist service: Not on file     Active member of club or organization: Not on file     Attends meetings of clubs or organizations: Not on file     Relationship status: Not on file    Intimate partner violence     Fear of current or ex partner: Not on file     Emotionally abused: Not on file     Physically abused: Not on file     Forced sexual activity: Not on file   Other Topics Concern    Not on file   Social History Narrative    Not on file     Family History   Problem Relation Age of Onset    Diabetes Mother     Cancer Brother         Bone Cancer Osteocarcinoma 1991       Subjective:      Review of Systems   Constitutional: Positive for chills and fatigue. Was sent home today from work due to body aches and sweats, also has no taste, symptoms started this morning. No fever  97.9   Respiratory: Negative for cough and shortness of breath. Gastrointestinal: Negative for abdominal pain, diarrhea, nausea and vomiting. Neurological: Positive for headaches. Objective: There were no vitals taken for this visit. Physical Exam  HENT:      Head: Normocephalic. Eyes:      Conjunctiva/sclera: Conjunctivae normal.   Pulmonary:      Effort: Pulmonary effort is normal. No respiratory distress. Neurological:      Mental Status: He is alert. Assessment:      Diagnosis Orders   1. Close contact within last 14 days with patient with respiratory symptoms  Droplet Plus Isolation - (Use only for COVID-19)    COVID-19 Ambulatory    Droplet Plus Isolation - (Use only for COVID-19)   2. Viral syndrome  COVID-19 Ambulatory     No results found for this visit on 11/05/20.    :     No follow-ups on file. Patient Instructions   Increase fluids except coffee, tea and alcohol. May use tylenol every 4-6 hours or ibuprofen every 8 hours as needed for comfort. If severe SOB or other breathing issues encourage to present to [facility] other ill contacts to act as though they have covid until testing returns.   Reviewed if positive 10 day quarantine from beginning of symptoms as long as fever not present off medication and other symptoms improving          Orders Placed This Encounter   Procedures    COVID-19 Ambulatory     Standing

## 2020-11-05 NOTE — PATIENT INSTRUCTIONS
Increase fluids except coffee, tea and alcohol. May use tylenol every 4-6 hours or ibuprofen every 8 hours as needed for comfort. If severe SOB or other breathing issues encourage to present to [facility]'s in [place] other ill contacts to act as though they have covid until testing returns.   Reviewed if positive 10 day quarantine from beginning of symptoms as long as fever not present off medication and other symptoms improving

## 2020-12-17 RX ORDER — OMEPRAZOLE 20 MG/1
CAPSULE, DELAYED RELEASE ORAL
Qty: 180 CAPSULE | Refills: 1 | Status: CANCELLED | OUTPATIENT
Start: 2020-12-17

## 2020-12-17 NOTE — TELEPHONE ENCOUNTER
Dave Leigh is calling to request a refill on the following medication(s):  Requested Prescriptions     Pending Prescriptions Disp Refills    omeprazole (PRILOSEC) 20 MG delayed release capsule 180 capsule 4       Last Visit Date (If Applicable):  4/54/7854    Next Visit Date:    Visit date not found

## 2020-12-21 NOTE — TELEPHONE ENCOUNTER
Ned Keller is requesting a refill on the following medication(s):  Requested Prescriptions     Pending Prescriptions Disp Refills    omeprazole (PRILOSEC) 20 MG delayed release capsule 180 capsule 4       Last Visit Date (If Applicable):  08/8/8371    Next Visit Date:    Visit date not found

## 2020-12-30 RX ORDER — OMEPRAZOLE 20 MG/1
20 CAPSULE, DELAYED RELEASE ORAL DAILY
Qty: 90 CAPSULE | Refills: 0 | Status: SHIPPED | OUTPATIENT
Start: 2020-12-30 | End: 2021-03-15

## 2021-03-29 ENCOUNTER — TELEPHONE (OUTPATIENT)
Dept: FAMILY MEDICINE CLINIC | Age: 51
End: 2021-03-29

## 2021-03-29 NOTE — TELEPHONE ENCOUNTER
Pt called and stated he had covid back in November of 2020 and never got his taste or smell back. Starting a month ago he gets a copper/blood taste in his mouth and wants to know if this is normal with having covid or if he should come see you for an appt. Please Advise.

## 2021-03-30 NOTE — TELEPHONE ENCOUNTER
Unfortunately we do not know how long the abnormality with taste and smell following covid can last. It has great variability by person. I do not see any other medications you are taking that should affect your taste in that fashion. We do not currently have any treatments which I am aware of that improve that symptom. Thanks for checking, hope your week gets better.

## 2021-06-24 NOTE — TELEPHONE ENCOUNTER
Patient called in for refill. Please change the directions to \"take 2 capsules a day\" because that is what Jeff Galicia told him to do but was not changed on his script. Patient has been having trouble getting the right amount of pills for a 90 day supply. Please change the script to reflect this change.

## 2021-07-16 RX ORDER — OMEPRAZOLE 20 MG/1
CAPSULE, DELAYED RELEASE ORAL
Qty: 180 CAPSULE | Refills: 1 | OUTPATIENT
Start: 2021-07-16

## 2021-07-16 NOTE — TELEPHONE ENCOUNTER
No documentation of reason for need of continued high dose medication treatment. On chart review noted over 1 year since in person evaluation. Need for follow up appointment to review, may have as adult well exam if desired. Continue on daily dose until evaluation.   Thanks

## 2021-07-27 RX ORDER — OMEPRAZOLE 20 MG/1
CAPSULE, DELAYED RELEASE ORAL
Qty: 90 CAPSULE | Refills: 0 | Status: SHIPPED | OUTPATIENT
Start: 2021-07-27 | End: 2021-08-23 | Stop reason: SDUPTHER

## 2021-08-23 ENCOUNTER — OFFICE VISIT (OUTPATIENT)
Dept: FAMILY MEDICINE CLINIC | Age: 51
End: 2021-08-23
Payer: COMMERCIAL

## 2021-08-23 VITALS
DIASTOLIC BLOOD PRESSURE: 72 MMHG | BODY MASS INDEX: 29.92 KG/M2 | OXYGEN SATURATION: 98 % | HEART RATE: 76 BPM | SYSTOLIC BLOOD PRESSURE: 98 MMHG | WEIGHT: 253.4 LBS | HEIGHT: 77 IN

## 2021-08-23 DIAGNOSIS — Z12.11 SCREEN FOR COLON CANCER: ICD-10-CM

## 2021-08-23 DIAGNOSIS — K21.9 GASTROESOPHAGEAL REFLUX DISEASE WITHOUT ESOPHAGITIS: Primary | ICD-10-CM

## 2021-08-23 DIAGNOSIS — F34.1 DYSTHYMIC DISORDER: ICD-10-CM

## 2021-08-23 DIAGNOSIS — J30.9 ALLERGIC RHINITIS, UNSPECIFIED SEASONALITY, UNSPECIFIED TRIGGER: ICD-10-CM

## 2021-08-23 DIAGNOSIS — H61.21 CERUMEN DEBRIS ON TYMPANIC MEMBRANE OF RIGHT EAR: ICD-10-CM

## 2021-08-23 PROCEDURE — 99214 OFFICE O/P EST MOD 30 MIN: CPT | Performed by: FAMILY MEDICINE

## 2021-08-23 RX ORDER — OMEPRAZOLE 20 MG/1
CAPSULE, DELAYED RELEASE ORAL
Qty: 180 CAPSULE | Refills: 0 | Status: SHIPPED | OUTPATIENT
Start: 2021-08-23 | End: 2021-09-27 | Stop reason: SDUPTHER

## 2021-08-23 SDOH — ECONOMIC STABILITY: FOOD INSECURITY: WITHIN THE PAST 12 MONTHS, THE FOOD YOU BOUGHT JUST DIDN'T LAST AND YOU DIDN'T HAVE MONEY TO GET MORE.: NEVER TRUE

## 2021-08-23 SDOH — ECONOMIC STABILITY: FOOD INSECURITY: WITHIN THE PAST 12 MONTHS, YOU WORRIED THAT YOUR FOOD WOULD RUN OUT BEFORE YOU GOT MONEY TO BUY MORE.: NEVER TRUE

## 2021-08-23 ASSESSMENT — SOCIAL DETERMINANTS OF HEALTH (SDOH): HOW HARD IS IT FOR YOU TO PAY FOR THE VERY BASICS LIKE FOOD, HOUSING, MEDICAL CARE, AND HEATING?: NOT HARD AT ALL

## 2021-08-23 ASSESSMENT — ENCOUNTER SYMPTOMS: SHORTNESS OF BREATH: 1

## 2021-08-23 NOTE — PROGRESS NOTES
[address] [address]  Dept: [phone]  Dept Fax: [phone]    Sanjiv Brewster is a 49 y.o. male who presents today for his medical conditions/complaints as noted below. Sanjiv Brewster c/o of Gastroesophageal Reflux      HPI:     HPI  Here for follow up of GERD, AR, dysthymia  Taking all medications regularly  No side effects noted    No other complaint currently  Noted severe heartburn when laying down at night \"horrible heartburn\"   Hoping to increase omeprazole. Stops eating at 8 pm bed at 930-10  Attempted adjustable bed but then lower back pain. Spouse not able to tolerate 6 inch blocks    Taking Northera from cardiology    Right ear irritation after water in [address]      BP Readings from Last 3 Encounters:   08/23/21 98/72   06/23/20 100/70   03/17/20 (!) 90/52          (goal 120/80)    Past Medical History:   Diagnosis Date    Heart palpitations     Long term current use of anticoagulant therapy 5/30/2017     Updating Deprecated Diagnoses    Near syncope 12/14/2015    MICHELLE (obstructive sleep apnea)     Otitis externa 5/30/2017    PE (pulmonary thromboembolism) ([facility] [address] 05/25/2017    Bilateral PEs     Pulmonary embolism [facility])     [facility] - ? post procedural    Pulmonary embolus ([facility] [address] 5/30/2017    Syncope 12/14/2015    Overview:  S/p Biotronik pacemaker 2014    TIA (transient ischemic attack) 09/2016      Past Surgical History:   Procedure Laterality Date    MALIGNANT SKIN LESION EXCISION  2012    PACEMAKER INSERTION  09/2014    VASECTOMY  09/2014       Family History   Problem Relation Age of Onset    Diabetes Mother     Cancer Brother         Bone Cancer Osteocarcinoma 1991       Social History     Tobacco Use    Smoking status: Never Smoker    Smokeless tobacco: Never Used   Substance Use Topics    Alcohol use: No      Prior to Visit Medications    Medication Sig Taking?  Authorizing Provider   omeprazole ([address] [facility]) 20 MG delayed release capsule TAKE 1 CAPSULE DAILY  Brian Blackmon MD   meloxicam (MOBIC) 15 MG tablet Take 1 tablet by mouth daily as needed for Pain (pleuritic pain)  Eliseo Tilley MD   montelukast (SINGULAIR) 10 MG tablet Take 1 tablet by mouth daily  Eliseo Tilley MD   citalopram (CELEXA) 20 MG tablet TAKE 1 TABLET TWICE A DAY ([facility]icking)  Eliseo Tilley MD   Droxidopa (NORTHERA) 100 MG CAPS Take by mouth 3 times daily as needed  Historical Provider, MD     Allergies   Allergen Reactions    Adhesive Tape     Azithromycin     Erythromycin      Hand swelling    Influenza Vaccines      SVT    Lipitor [Atorvastatin]      bruising    Nitroglycerin      Syncopal episode    Nubain [Nalbuphine]      Hand swelling    Other      Adhesive tape , skin tear     Valium [Diazepam]      Does not like how he felt       Health Maintenance   Topic Date Due    Hepatitis C screen  Never done    HIV screen  Never done    DTaP/Tdap/Td vaccine (1 - Tdap) Never done    Colon cancer screen colonoscopy  Never done    Shingles Vaccine (1 of 2) 08/01/2029 (Originally 7/27/2021)    Lipid screen  02/20/2025    COVID-19 Vaccine  Completed    Hepatitis A vaccine  Aged Out    Hepatitis B vaccine  Aged Out    Hib vaccine  Aged Out    Meningococcal (ACWY) vaccine  Aged Out    Pneumococcal 0-64 years Vaccine  Aged Out       Subjective:      Review of Systems   Constitutional: Negative for fatigue. Respiratory: Positive for shortness of breath. Cardiovascular: Positive for palpitations. Negative for chest pain and leg swelling. Neurological: Negative for dizziness and headaches. Objective:     BP 98/72 (Site: Left Upper Arm, Position: Sitting, Cuff Size: Medium Adult)   Pulse 76   Ht 6' 5\" (1.956 m)   Wt 253 lb 6.4 oz (114.9 kg)   SpO2 98%   BMI 30.05 kg/m²     Physical Exam  Vitals reviewed. Constitutional:       General: He is not in acute distress. Appearance: He is well-developed.    HENT:      Head: Atraumatic. Eyes:      Conjunctiva/sclera: Conjunctivae normal.   Neck:      Thyroid: No thyromegaly. Vascular: No carotid bruit. Cardiovascular:      Rate and Rhythm: Normal rate and regular rhythm. Heart sounds: No murmur heard. Pulmonary:      Effort: Pulmonary effort is normal.      Breath sounds: Normal breath sounds. Abdominal:      General: Bowel sounds are normal. There is no distension. Palpations: Abdomen is soft. Tenderness: There is no abdominal tenderness. Musculoskeletal:         General: No swelling (BLE). Cervical back: Neck supple. Right lower leg: No swelling. Left lower leg: No swelling. Lymphadenopathy:      Cervical: No cervical adenopathy. Neurological:      Mental Status: He is alert and oriented to person, place, and time. Psychiatric:         Thought Content: Thought content normal.         Judgment: Judgment normal.         Assessment:     1. Gastroesophageal reflux disease without esophagitis    2. Allergic rhinitis, unspecified seasonality, unspecified trigger    3. Dysthymic disorder    4. Screen for colon cancer    5. Cerumen debris on tympanic membrane of right ear      No results found for this visit on 08/23/21. Plan:     Orders Placed This Encounter   Procedures    [facility] - Lisette Mendieta DO, Gastroenterology, [place]     Referral Priority:   Routine     Referral Type:   Eval and Treat     Referral Reason:   Specialty Services Required     Referred to Provider:   La Nena Sifuentes DO     Requested Specialty:   Gastroenterology     Number of Visits Requested:   1        Return in about 1 year (around 8/23/2022) for GERD, dysthymic. Patient Instructions   Tdap encouraged when desired at pharmacy       Discussed use, benefit, and side effects of prescribed medications. All patient questions answered. Pt voiced understanding. Reviewed health maintenance Tdap. Instructed to continue current medications, diet and exercise.   Patient

## 2021-09-22 PROBLEM — Z12.11 SCREEN FOR COLON CANCER: Status: RESOLVED | Noted: 2021-08-23 | Resolved: 2021-09-22

## 2021-09-27 DIAGNOSIS — K21.9 GASTROESOPHAGEAL REFLUX DISEASE WITHOUT ESOPHAGITIS: ICD-10-CM

## 2021-09-27 DIAGNOSIS — R55 NEUROCARDIOGENIC SYNCOPE: ICD-10-CM

## 2021-09-27 RX ORDER — CITALOPRAM 20 MG/1
TABLET ORAL
Qty: 180 TABLET | Refills: 1 | Status: SHIPPED | OUTPATIENT
Start: 2021-09-27 | End: 2022-05-26 | Stop reason: SDUPTHER

## 2021-09-27 RX ORDER — OMEPRAZOLE 20 MG/1
CAPSULE, DELAYED RELEASE ORAL
Qty: 180 CAPSULE | Refills: 1 | Status: SHIPPED | OUTPATIENT
Start: 2021-09-27 | End: 2022-05-26 | Stop reason: SDUPTHER

## 2021-09-27 NOTE — TELEPHONE ENCOUNTER
Edgar Crump is requesting a refill on the following medication(s):  Requested Prescriptions     Pending Prescriptions Disp Refills    citalopram (CELEXA) 20 MG tablet 180 tablet 1    omeprazole (PRILOSEC) 20 MG delayed release capsule 180 capsule 0     Sig: TAKE 1 CAPSULE twice DAILY       Last Visit Date (If Applicable):  2/03/9591    Next Visit Date:    Visit date not found

## 2022-05-26 ENCOUNTER — OFFICE VISIT (OUTPATIENT)
Dept: FAMILY MEDICINE CLINIC | Age: 52
End: 2022-05-26
Payer: COMMERCIAL

## 2022-05-26 VITALS
HEART RATE: 81 BPM | WEIGHT: 247 LBS | OXYGEN SATURATION: 96 % | RESPIRATION RATE: 16 BRPM | SYSTOLIC BLOOD PRESSURE: 98 MMHG | TEMPERATURE: 97.6 F | BODY MASS INDEX: 29.29 KG/M2 | DIASTOLIC BLOOD PRESSURE: 70 MMHG

## 2022-05-26 DIAGNOSIS — K21.9 GASTROESOPHAGEAL REFLUX DISEASE WITHOUT ESOPHAGITIS: ICD-10-CM

## 2022-05-26 DIAGNOSIS — R55 NEUROCARDIOGENIC SYNCOPE: Primary | ICD-10-CM

## 2022-05-26 PROCEDURE — 99213 OFFICE O/P EST LOW 20 MIN: CPT | Performed by: FAMILY MEDICINE

## 2022-05-26 RX ORDER — DROXIDOPA 200 MG/1
CAPSULE ORAL
COMMUNITY
Start: 2022-05-12

## 2022-05-26 RX ORDER — CITALOPRAM 20 MG/1
TABLET ORAL
Qty: 180 TABLET | Refills: 1 | Status: SHIPPED | OUTPATIENT
Start: 2022-05-26

## 2022-05-26 RX ORDER — OMEPRAZOLE 20 MG/1
CAPSULE, DELAYED RELEASE ORAL
Qty: 180 CAPSULE | Refills: 1 | Status: SHIPPED | OUTPATIENT
Start: 2022-05-26

## 2022-05-26 ASSESSMENT — PATIENT HEALTH QUESTIONNAIRE - PHQ9
SUM OF ALL RESPONSES TO PHQ QUESTIONS 1-9: 1
7. TROUBLE CONCENTRATING ON THINGS, SUCH AS READING THE NEWSPAPER OR WATCHING TELEVISION: 0
9. THOUGHTS THAT YOU WOULD BE BETTER OFF DEAD, OR OF HURTING YOURSELF: 0
3. TROUBLE FALLING OR STAYING ASLEEP: 0
SUM OF ALL RESPONSES TO PHQ QUESTIONS 1-9: 1
SUM OF ALL RESPONSES TO PHQ QUESTIONS 1-9: 1
2. FEELING DOWN, DEPRESSED OR HOPELESS: 0
6. FEELING BAD ABOUT YOURSELF - OR THAT YOU ARE A FAILURE OR HAVE LET YOURSELF OR YOUR FAMILY DOWN: 0
1. LITTLE INTEREST OR PLEASURE IN DOING THINGS: 0
SUM OF ALL RESPONSES TO PHQ9 QUESTIONS 1 & 2: 0
SUM OF ALL RESPONSES TO PHQ QUESTIONS 1-9: 1
10. IF YOU CHECKED OFF ANY PROBLEMS, HOW DIFFICULT HAVE THESE PROBLEMS MADE IT FOR YOU TO DO YOUR WORK, TAKE CARE OF THINGS AT HOME, OR GET ALONG WITH OTHER PEOPLE: 0
5. POOR APPETITE OR OVEREATING: 0
8. MOVING OR SPEAKING SO SLOWLY THAT OTHER PEOPLE COULD HAVE NOTICED. OR THE OPPOSITE, BEING SO FIGETY OR RESTLESS THAT YOU HAVE BEEN MOVING AROUND A LOT MORE THAN USUAL: 0
4. FEELING TIRED OR HAVING LITTLE ENERGY: 1

## 2022-05-26 ASSESSMENT — ENCOUNTER SYMPTOMS
BLOOD IN STOOL: 0
ABDOMINAL PAIN: 0
CHEST TIGHTNESS: 0
NAUSEA: 0
SHORTNESS OF BREATH: 0
VOMITING: 0

## 2022-05-26 NOTE — PROGRESS NOTES
Sanjiv Brewster (:  1970) is a 49 y.o. male,Established patient, here for evaluation of the following chief complaint(s):  Established New Doctor (syncope, light headed dizzy, says he is lathargic and weak . Has feeling of one now LAst week end had one and was off Friday and Saturday. )         ASSESSMENT/PLAN:  1. Neurocardiogenic syncope  Comments:  Continue Celexa. Continue with cardiologist and consider increase of Droxidopa. Orders:  -     citalopram (CELEXA) 20 mg tablet; TAKE 1 TABLET TWICE A DAY (NEW DOSE), Disp-180 tablet, R-1Normal  2. Gastroesophageal reflux disease without esophagitis  Comments:  Continue PPI. Call if worsens. Orders:  -     omeprazole (PRILOSEC) 20 MG delayed release capsule; TAKE 1 CAPSULE twice DAILY, Disp-180 capsule, R-1Normal      Return in about 6 months (around 2022). Subjective   SUBJECTIVE/OBJECTIVE:  Has a h/o cardiogenic syncope; takes Celexa and Droxidopa. Has appt with cardiologist in 3 weeks. He is doing much better with the medication but has been having more episodes over the past week. He takes his medicine on a regular basis. He plans on discussing increasing the medicine with the cardiologist at that appointment. He does have some palpitations with the episodes and his pacemaker has been firing more often this past week as well. He denies chest pain or shortness of breath. This was first diagnosed in . GERD: Well controlled with the PPI. He denies melena or blood in the bowel movements. Review of Systems   Constitutional: Negative for activity change, appetite change, fatigue and fever. Respiratory: Negative for chest tightness and shortness of breath. Cardiovascular: Positive for palpitations. Negative for chest pain and leg swelling. Gastrointestinal: Negative for abdominal pain, blood in stool, nausea and vomiting. Endocrine: Negative for polyuria.    Genitourinary: Negative for difficulty urinating, dysuria, frequency and hematuria. Skin: Negative for pallor. Neurological: Positive for dizziness, weakness and light-headedness. Hematological: Negative for adenopathy. Does not bruise/bleed easily. Objective   Physical Exam  Constitutional:       General: He is not in acute distress. HENT:      Head: Normocephalic. Eyes:      Extraocular Movements: Extraocular movements intact. Pupils: Pupils are equal, round, and reactive to light. Cardiovascular:      Rate and Rhythm: Normal rate and regular rhythm. Heart sounds: No murmur heard. Pulmonary:      Effort: Pulmonary effort is normal.      Breath sounds: Normal breath sounds. Musculoskeletal:         General: No swelling. Cervical back: Neck supple. Neurological:      General: No focal deficit present. Mental Status: He is alert and oriented to person, place, and time. Psychiatric:         Mood and Affect: Mood normal.         Behavior: Behavior normal.                An electronic signature was used to authenticate this note.     --Joelle De Jesus MD

## 2022-07-14 ENCOUNTER — TELEPHONE (OUTPATIENT)
Dept: FAMILY MEDICINE CLINIC | Age: 52
End: 2022-07-14

## 2022-07-14 NOTE — TELEPHONE ENCOUNTER
[facility] ER called regarding a Dr Ehsan Martinez patient. They would like to talk with a provider about his care.

## 2022-07-18 ENCOUNTER — TELEPHONE (OUTPATIENT)
Dept: FAMILY MEDICINE CLINIC | Age: 52
End: 2022-07-18

## 2022-08-23 ENCOUNTER — TELEPHONE (OUTPATIENT)
Dept: FAMILY MEDICINE CLINIC | Age: 52
End: 2022-08-23

## 2022-08-23 DIAGNOSIS — Z12.11 COLON CANCER SCREENING: Primary | ICD-10-CM

## 2022-09-01 LAB — NONINV COLON CA DNA+OCC BLD SCRN STL QL: NORMAL

## 2022-10-05 LAB — NONINV COLON CA DNA+OCC BLD SCRN STL QL: NEGATIVE

## 2022-11-10 ENCOUNTER — TELEPHONE (OUTPATIENT)
Dept: FAMILY MEDICINE CLINIC | Age: 52
End: 2022-11-10

## 2022-11-10 NOTE — TELEPHONE ENCOUNTER
Called patient and informed of Dr. Gisela Barry leaving the Ministry and he would like to be called when Dr. Crystal Armenta has an open schedule.

## 2022-11-21 DIAGNOSIS — K21.9 GASTROESOPHAGEAL REFLUX DISEASE WITHOUT ESOPHAGITIS: ICD-10-CM

## 2022-11-21 DIAGNOSIS — R55 NEUROCARDIOGENIC SYNCOPE: ICD-10-CM

## 2022-11-21 RX ORDER — CITALOPRAM 20 MG/1
TABLET ORAL
Qty: 180 TABLET | Refills: 0 | Status: SHIPPED | OUTPATIENT
Start: 2022-11-21

## 2022-11-21 RX ORDER — OMEPRAZOLE 20 MG/1
CAPSULE, DELAYED RELEASE ORAL
Qty: 180 CAPSULE | Refills: 0 | Status: SHIPPED | OUTPATIENT
Start: 2022-11-21

## 2022-11-21 NOTE — TELEPHONE ENCOUNTER
Osiris Simon is requesting a refill on the following medication(s):  Requested Prescriptions     Pending Prescriptions Disp Refills    citalopram (CELEXA) 20 MG tablet [Pharmacy Med Name: Citalopram Hydrobromide 20 MG Oral Tablet] 180 tablet 3     Sig: TAKE 1 TABLET BY MOUTH  TWICE DAILY    omeprazole (PRILOSEC) 20 MG delayed release capsule [Pharmacy Med Name: Omeprazole 20 MG Oral Capsule Delayed Release] 180 capsule 3     Sig: TAKE 1 CAPSULE BY MOUTH  TWICE DAILY       Last Visit Date (If Applicable):  3/04/4398    Next Visit Date:    Visit date not found

## 2023-01-31 DIAGNOSIS — K21.9 GASTROESOPHAGEAL REFLUX DISEASE WITHOUT ESOPHAGITIS: ICD-10-CM

## 2023-01-31 DIAGNOSIS — R55 NEUROCARDIOGENIC SYNCOPE: ICD-10-CM

## 2023-01-31 RX ORDER — OMEPRAZOLE 20 MG/1
CAPSULE, DELAYED RELEASE ORAL
Qty: 180 CAPSULE | Refills: 3 | Status: SHIPPED | OUTPATIENT
Start: 2023-01-31

## 2023-01-31 RX ORDER — CITALOPRAM 20 MG/1
TABLET ORAL
Qty: 180 TABLET | Refills: 3 | Status: SHIPPED | OUTPATIENT
Start: 2023-01-31

## 2023-01-31 NOTE — TELEPHONE ENCOUNTER
Patient is ok to wait for rx until 2//6 appt if needed.        Evanleonel Jono is requesting a refill on the following medication(s):  Requested Prescriptions     Pending Prescriptions Disp Refills    citalopram (CELEXA) 20 MG tablet 180 tablet 3     Sig: TAKE 1 TABLET BY MOUTH  TWICE DAILY    omeprazole (PRILOSEC) 20 MG delayed release capsule 180 capsule 3     Sig: TAKE 1 CAPSULE BY MOUTH  TWICE DAILY       Last Visit Date (If Applicable):  Visit date not found    Next Visit Date:    2/6/2023

## 2023-02-17 ENCOUNTER — OFFICE VISIT (OUTPATIENT)
Dept: FAMILY MEDICINE CLINIC | Age: 53
End: 2023-02-17

## 2023-02-17 VITALS
DIASTOLIC BLOOD PRESSURE: 78 MMHG | SYSTOLIC BLOOD PRESSURE: 120 MMHG | BODY MASS INDEX: 29.05 KG/M2 | HEART RATE: 77 BPM | WEIGHT: 245 LBS | OXYGEN SATURATION: 97 %

## 2023-02-17 DIAGNOSIS — Z12.5 PROSTATE CANCER SCREENING: ICD-10-CM

## 2023-02-17 DIAGNOSIS — R55 NEUROCARDIOGENIC SYNCOPE: ICD-10-CM

## 2023-02-17 DIAGNOSIS — K21.9 GASTROESOPHAGEAL REFLUX DISEASE WITHOUT ESOPHAGITIS: ICD-10-CM

## 2023-02-17 DIAGNOSIS — Z86.718 H/O THROMBOEMBOLISM: ICD-10-CM

## 2023-02-17 DIAGNOSIS — Z76.89 ENCOUNTER TO ESTABLISH CARE: Primary | ICD-10-CM

## 2023-02-17 RX ORDER — FLUDROCORTISONE ACETATE 0.1 MG/1
TABLET ORAL
Qty: 30 TABLET | Refills: 3 | Status: SHIPPED | OUTPATIENT
Start: 2023-02-17

## 2023-02-17 RX ORDER — MIDODRINE HYDROCHLORIDE 2.5 MG/1
TABLET ORAL
COMMUNITY
Start: 2023-01-29

## 2023-02-17 RX ORDER — APIXABAN 5 MG/1
TABLET, FILM COATED ORAL
COMMUNITY
Start: 2023-01-29

## 2023-02-17 RX ORDER — FLUDROCORTISONE ACETATE 0.1 MG/1
TABLET ORAL
COMMUNITY
Start: 2023-01-23 | End: 2023-02-17 | Stop reason: SDUPTHER

## 2023-02-17 SDOH — ECONOMIC STABILITY: INCOME INSECURITY: HOW HARD IS IT FOR YOU TO PAY FOR THE VERY BASICS LIKE FOOD, HOUSING, MEDICAL CARE, AND HEATING?: NOT HARD AT ALL

## 2023-02-17 SDOH — ECONOMIC STABILITY: FOOD INSECURITY: WITHIN THE PAST 12 MONTHS, YOU WORRIED THAT YOUR FOOD WOULD RUN OUT BEFORE YOU GOT MONEY TO BUY MORE.: NEVER TRUE

## 2023-02-17 SDOH — ECONOMIC STABILITY: FOOD INSECURITY: WITHIN THE PAST 12 MONTHS, THE FOOD YOU BOUGHT JUST DIDN'T LAST AND YOU DIDN'T HAVE MONEY TO GET MORE.: NEVER TRUE

## 2023-02-17 SDOH — ECONOMIC STABILITY: HOUSING INSECURITY
IN THE LAST 12 MONTHS, WAS THERE A TIME WHEN YOU DID NOT HAVE A STEADY PLACE TO SLEEP OR SLEPT IN A SHELTER (INCLUDING NOW)?: NO

## 2023-02-17 ASSESSMENT — PATIENT HEALTH QUESTIONNAIRE - PHQ9
9. THOUGHTS THAT YOU WOULD BE BETTER OFF DEAD, OR OF HURTING YOURSELF: 0
5. POOR APPETITE OR OVEREATING: 0
7. TROUBLE CONCENTRATING ON THINGS, SUCH AS READING THE NEWSPAPER OR WATCHING TELEVISION: 0
6. FEELING BAD ABOUT YOURSELF - OR THAT YOU ARE A FAILURE OR HAVE LET YOURSELF OR YOUR FAMILY DOWN: 0
SUM OF ALL RESPONSES TO PHQ QUESTIONS 1-9: 0
SUM OF ALL RESPONSES TO PHQ QUESTIONS 1-9: 0
4. FEELING TIRED OR HAVING LITTLE ENERGY: 0
8. MOVING OR SPEAKING SO SLOWLY THAT OTHER PEOPLE COULD HAVE NOTICED. OR THE OPPOSITE, BEING SO FIGETY OR RESTLESS THAT YOU HAVE BEEN MOVING AROUND A LOT MORE THAN USUAL: 0
SUM OF ALL RESPONSES TO PHQ QUESTIONS 1-9: 0
2. FEELING DOWN, DEPRESSED OR HOPELESS: 0
3. TROUBLE FALLING OR STAYING ASLEEP: 0
SUM OF ALL RESPONSES TO PHQ QUESTIONS 1-9: 0
SUM OF ALL RESPONSES TO PHQ9 QUESTIONS 1 & 2: 0
1. LITTLE INTEREST OR PLEASURE IN DOING THINGS: 0

## 2023-02-17 NOTE — PROGRESS NOTES
HPI:  Patient comes in today for   Chief Complaint   Patient presents with    Established New Doctor     Was pt od Dr. Prince Wall   Here to establish h/o cardiogenic syncope was started on florinef recently by cardiology and is helping is also on midodrine. Has pacemaker in place. H/o MICHELLE on CPAP before is not using it the past 5 years since he lost some weight and also was intolerant to the mask so cardiology took him of. Prior h/ o PE's last episode was 5 months ago following Covid  is on Eliquis long term. H/o Angel Ralphs is on omeprazole.   HISTORY:  Past Medical History:   Diagnosis Date    Heart palpitations     Long term current use of anticoagulant therapy 5/30/2017     Updating Deprecated Diagnoses    Near syncope 12/14/2015    MICHELLE (obstructive sleep apnea)     Otitis externa 5/30/2017    PE (pulmonary thromboembolism) ([address] 05/25/2017    Bilateral PEs     Pulmonary embolism [facility])     [facility] - ? post procedural    Pulmonary embolus ([facility] [address] 5/30/2017    Syncope 12/14/2015    Overview:  S/p Biotronik pacemaker 2014    TIA (transient ischemic attack) 09/2016       Past Surgical History:   Procedure Laterality Date    MALIGNANT SKIN LESION EXCISION  2012    PACEMAKER INSERTION  09/2014    VASECTOMY  09/2014        Family History   Problem Relation Age of Onset    Diabetes Mother     Cancer Brother         Bone Cancer Osteocarcinoma 1991       Social History     Socioeconomic History    Marital status:      Spouse name: Not on file    Number of children: Not on file    Years of education: Not on file    Highest education level: Not on file   Occupational History    Not on file   Tobacco Use    Smoking status: Never    Smokeless tobacco: Never   Vaping Use    Vaping Use: Never used   Substance and Sexual Activity    Alcohol use: No    Drug use: Not on file    Sexual activity: Not on file   Other Topics Concern    Not on file   Social History Narrative    Not on file     Social Determinants of Health Financial Resource Strain: Low Risk     Difficulty of Paying Living Expenses: Not hard at all   Food Insecurity: No Food Insecurity    Worried About [facility] in the Last Year: Never true    Ran Out of Food in the Last Year: Never true   Transportation Needs: Unknown    Lack of Transportation (Medical): Not on file    Lack of Transportation (Non-Medical): No   Physical Activity: Not on file   Stress: Not on file   Social Connections: Not on file   Intimate Partner Violence: Not on file   Housing Stability: Unknown    Unable to Pay for Housing in the Last Year: Not on file    Number of Places Lived in the Last Year: Not on file    Unstable Housing in the Last Year: No       Current Outpatient Medications   Medication Sig Dispense Refill    midodrine (PROAMATINE) 2.5 MG tablet       ELIQUIS 5 MG TABS tablet       fludrocortisone (FLORINEF) 0.1 MG tablet TAKE 1 TABLET BY MOUTH ONCE DAILY 30 tablet 3    citalopram (CELEXA) 20 MG tablet TAKE 1 TABLET BY MOUTH  TWICE DAILY 180 tablet 3    omeprazole (PRILOSEC) 20 MG delayed release capsule TAKE 1 CAPSULE BY MOUTH  TWICE DAILY 180 capsule 3    Droxidopa 200 MG CAPS TAKE 1 CAPSULE BY MOUTH TWICE DAILY       No current facility-administered medications for this visit. Allergies   Allergen Reactions    Adhesive Tape     Azithromycin     Erythromycin      Hand swelling    Influenza Vaccines      SVT    Lipitor [Atorvastatin]      bruising    Nitroglycerin      Syncopal episode    Nubain [Nalbuphine]      Hand swelling    Other      Adhesive tape , skin tear     Valium [Diazepam]      Does not like how he felt       REVIEW OF SYSTEMS:  General: No fevers, chills, change in weight  HEENT: No double vision, blurry vision, runny nose, sore throat, tinnitus  Cardio: No chest pain, palpitations, LERMA, edema, PND  Pulmonary: No cough, hemoptysis, SOB  GI: No nausea, vomiting, dysphagia, odynophagia, diarrhea, constipation. h/o hemorrhoids  : No dysuria, hematuria, urgency, incontinence  Musculoskeletal: No muscle or joint aches, no joint swelling  Neuro: No dizziness/lightheadedness, no seizures  Endocrine: No polyuria, polydipsia, polyphagia, no temperature intolerance  Skin: No lesions or itching  No problems with ADLs  Sleep: Good   Psychiatric: No depression or anxiety    PHYSICAL EXAM:  VS:  /78 (Site: Right Upper Arm, Position: Sitting, Cuff Size: Medium Adult)   Pulse 77   Wt 245 lb (111.1 kg)   SpO2 97%   BMI 29.05 kg/m²   General:  Alert and oriented, NAD  HEENT:  TMs, SHERINE, EOMI, Conjunctivae clear       Throat currently clear. NECK:  Supple without adenopathy or thyromegaly, no carotid bruits  LUNGS:  CTA all fields  HEART:  RRR without M, R, or G  ABDOMEN:  Soft and nontender without palpable abnormalities  EXTREMITIES:  Without clubbing, cyanosis, or edema, no calf tenderness  NEURO:  No focal deficits. SKIN:  warm to touch,normal texture. No active lesions. ASSESSMENT/PLAN:     Diagnosis Orders   1. Encounter to establish care  Comprehensive Metabolic Panel    Lipid Panel    TSH    CBC      2. Neurocardiogenic syncope  Comprehensive Metabolic Panel    Lipid Panel    TSH    CBC      3.  Gastroesophageal reflux disease without esophagitis            Orders Placed This Encounter   Procedures    Comprehensive Metabolic Panel     Standing Status:   Future     Standing Expiration Date:   2/17/2024    Lipid Panel     Standing Status:   Future     Standing Expiration Date:   2/17/2024     Order Specific Question:   Is Patient Fasting?/# of Hours     Answer:   12 hours    TSH     Standing Status:   Future     Standing Expiration Date:   2/17/2024    CBC     Standing Status:   Future     Standing Expiration Date:   2/17/2024    PSA Screening     Standing Status:   Future     Standing Expiration Date:   2/17/2024     Requested Prescriptions     Signed Prescriptions Disp Refills    fludrocortisone (FLORINEF) 0.1 MG tablet 30 tablet 3     Sig: TAKE 1 TABLET BY MOUTH ONCE DAILY   Labs ordered  Has had negative cologuard in 7/2022. Continue current meds. F/u with cardiology  No follow-ups on file.     Electronically signed by Miguel Lynne MD

## 2023-05-17 ENCOUNTER — OFFICE VISIT (OUTPATIENT)
Dept: FAMILY MEDICINE CLINIC | Age: 53
End: 2023-05-17
Payer: COMMERCIAL

## 2023-05-17 VITALS
BODY MASS INDEX: 29.05 KG/M2 | OXYGEN SATURATION: 95 % | SYSTOLIC BLOOD PRESSURE: 115 MMHG | HEIGHT: 77 IN | DIASTOLIC BLOOD PRESSURE: 82 MMHG | WEIGHT: 246 LBS | HEART RATE: 96 BPM

## 2023-05-17 DIAGNOSIS — Z99.89 OSA ON CPAP: ICD-10-CM

## 2023-05-17 DIAGNOSIS — R55 NEUROCARDIOGENIC SYNCOPE: Primary | ICD-10-CM

## 2023-05-17 DIAGNOSIS — K21.9 GASTROESOPHAGEAL REFLUX DISEASE WITHOUT ESOPHAGITIS: ICD-10-CM

## 2023-05-17 DIAGNOSIS — Z86.718 H/O THROMBOEMBOLISM: ICD-10-CM

## 2023-05-17 DIAGNOSIS — G47.33 OSA (OBSTRUCTIVE SLEEP APNEA): ICD-10-CM

## 2023-05-17 DIAGNOSIS — G47.33 OSA ON CPAP: ICD-10-CM

## 2023-05-17 LAB
ALBUMIN/GLOBULIN RATIO: 1.3 G/DL
ALBUMIN: 4.6 G/DL (ref 3.5–5)
ALP BLD-CCNC: 69 UNITS/L (ref 38–126)
ALT SERPL-CCNC: 37 UNITS/L (ref 4–50)
ANION GAP SERPL CALCULATED.3IONS-SCNC: 6.5 MMOL/L
AST SERPL-CCNC: 33 UNITS/L (ref 17–59)
BASOPHILS %: 1.73 (ref 0–3)
BASOPHILS ABSOLUTE: 0.12 (ref 0–0.3)
BILIRUB SERPL-MCNC: 0.8 MG/DL (ref 0.2–1.3)
BUN BLDV-MCNC: 18 MG/DL (ref 9–20)
CALCIUM SERPL-MCNC: 9.9 MG/DL (ref 8.4–10.2)
CHLORIDE BLD-SCNC: 109 MMOL/L (ref 98–120)
CHOLESTEROL/HDL RATIO: 6.12 RATIO (ref 0–4.5)
CHOLESTEROL: 263 MG/DL (ref 50–200)
CO2: 25 MMOL/L (ref 22–31)
CREAT SERPL-MCNC: 1.1 MG/DL (ref 0.7–1.3)
DIAGNOSTIC PSA: 2.2 NG/ML (ref 0–4)
EOSINOPHILS %: 3.87 (ref 0–10)
EOSINOPHILS ABSOLUTE: 0.27 (ref 0–1.1)
GFR CALCULATED: > 60
GLOBULIN: 3.4 G/DL
GLUCOSE: 82 MG/DL (ref 75–110)
HCT VFR BLD CALC: 45.1 % (ref 42–52)
HDLC SERPL-MCNC: 43 MG/DL (ref 36–68)
HEMOGLOBIN: 15.4 (ref 13.8–17.8)
LDL CHOLESTEROL CALCULATED: 191.6 MG/DL (ref 0–160)
LYMPHOCYTE %: 36.8 (ref 20–51.1)
LYMPHOCYTES ABSOLUTE: 2.54 (ref 1–5.5)
MCH RBC QN AUTO: 28.1 PG (ref 28.5–32.5)
MCHC RBC AUTO-ENTMCNC: 34 G/DL (ref 32–37)
MCV RBC AUTO: 82.6 FL (ref 80–94)
MONOCYTES %: 11.21 (ref 1.7–9.3)
MONOCYTES ABSOLUTE: 0.77 (ref 0.1–1)
NEUTROPHILS %: 46.4 (ref 42.2–75.2)
NEUTROPHILS ABSOLUTE: 3.2 (ref 2–8.1)
PDW BLD-RTO: 12.7 % (ref 10–15.5)
PLATELET # BLD: 277.3 THOU/MM3 (ref 130–400)
POTASSIUM SERPL-SCNC: 4.2 MMOL/L (ref 3.6–5)
RBC: 5.46 M/UL (ref 4.7–6.1)
SODIUM BLD-SCNC: 141 MMOL/L (ref 135–145)
TOTAL PROTEIN, SERUM: 8 G/DL (ref 6.3–8.2)
TRIGL SERPL-MCNC: 142 MG/DL (ref 10–250)
TSH SERPL DL<=0.05 MIU/L-ACNC: 1.06 MIU/ML (ref 0.49–4.67)
VLDLC SERPL CALC-MCNC: 28 MG/DL (ref 0–50)
WBC: 6.9 THOU/ML3 (ref 4.8–10.8)

## 2023-05-17 PROCEDURE — 99214 OFFICE O/P EST MOD 30 MIN: CPT | Performed by: FAMILY MEDICINE

## 2023-05-17 NOTE — PROGRESS NOTES
Sexual activity: Not on file   Other Topics Concern    Not on file   Social History Narrative    Not on file     Social Determinants of Health     Financial Resource Strain: Low Risk     Difficulty of Paying Living Expenses: Not hard at all   Food Insecurity: No Food Insecurity    Worried About [address] in the Last Year: Never true    [address] in the Last Year: Never true   Transportation Needs: Unknown    Lack of Transportation (Medical): Not on file    Lack of Transportation (Non-Medical): No   Physical Activity: Not on file   Stress: Not on file   Social Connections: Not on file   Intimate Partner Violence: Not on file   Housing Stability: Unknown    Unable to Pay for Housing in the Last Year: Not on file    Number of Places Lived in the Last Year: Not on file    Unstable Housing in the Last Year: No       Current Outpatient Medications   Medication Sig Dispense Refill    midodrine (PROAMATINE) 2.5 MG tablet       ELIQUIS 5 MG TABS tablet       fludrocortisone (FLORINEF) 0.1 MG tablet TAKE 1 TABLET BY MOUTH ONCE DAILY 30 tablet 3    citalopram (CELEXA) 20 MG tablet TAKE 1 TABLET BY MOUTH  TWICE DAILY 180 tablet 3    omeprazole (PRILOSEC) 20 MG delayed release capsule TAKE 1 CAPSULE BY MOUTH  TWICE DAILY 180 capsule 3    Droxidopa 200 MG CAPS TAKE 1 CAPSULE BY MOUTH TWICE DAILY       No current facility-administered medications for this visit.         Allergies   Allergen Reactions    Adhesive Tape     Azithromycin     Erythromycin      Hand swelling    Influenza Vaccines      SVT    Lipitor [Atorvastatin]      bruising    Nitroglycerin      Syncopal episode    Nubain [Nalbuphine]      Hand swelling    Other      Adhesive tape , skin tear     Valium [Diazepam]      Does not like how he felt       REVIEW OF SYSTEMS:  General: No fevers, chills, change in weight  HEENT: No double vision, blurry vision, runny nose, sore throat, tinnitus  Cardio: No chest pain, palpitations, LERMA, edema,

## 2023-05-23 ENCOUNTER — OFFICE VISIT (OUTPATIENT)
Dept: FAMILY MEDICINE CLINIC | Age: 53
End: 2023-05-23
Payer: COMMERCIAL

## 2023-05-23 VITALS
BODY MASS INDEX: 29.53 KG/M2 | OXYGEN SATURATION: 98 % | WEIGHT: 249 LBS | DIASTOLIC BLOOD PRESSURE: 82 MMHG | SYSTOLIC BLOOD PRESSURE: 122 MMHG

## 2023-05-23 DIAGNOSIS — E78.00 HYPERCHOLESTEREMIA: Primary | ICD-10-CM

## 2023-05-23 DIAGNOSIS — G47.33 OSA ON CPAP: ICD-10-CM

## 2023-05-23 DIAGNOSIS — R55 NEUROCARDIOGENIC SYNCOPE: ICD-10-CM

## 2023-05-23 DIAGNOSIS — Z99.89 OSA ON CPAP: ICD-10-CM

## 2023-05-23 PROCEDURE — 99214 OFFICE O/P EST MOD 30 MIN: CPT | Performed by: FAMILY MEDICINE

## 2023-05-23 RX ORDER — ROSUVASTATIN CALCIUM 10 MG/1
10 TABLET, COATED ORAL NIGHTLY
Qty: 30 TABLET | Refills: 3 | Status: SHIPPED | OUTPATIENT
Start: 2023-05-23

## 2023-05-23 NOTE — PROGRESS NOTES
HPI:  Patient comes in today for   Chief Complaint   Patient presents with    Other     Wants to talk about Cholesterol he did not fast is wondering if he needs another test and heart meds   Here to f/u on labs has elevated cholesterol has been high in past was on lipitor briefly quit due to bruising no other side effects. h/o cardiogenic syncope was started on florinef recently by cardiology and is  on midodrine and Droxidopa also has pacemaker in place wants to see if he can cut back on his medications since he is doing better now. Denies any chest pain or shortness of breath. H/o MICHELLE was on CPAP before is not using it the past 5 years since he lost some weight and also was intolerant to the face mask a new script last visit is awaiting new nasal mask. Prior h/o PE's last episode was 5 months ago following Covid  is on Eliquis long term. H/o Nan Mention is on omeprazole. Has not gotten his labs done yet.   HISTORY:  Past Medical History:   Diagnosis Date    Heart palpitations     Long term current use of anticoagulant therapy 5/30/2017     Updating Deprecated Diagnoses    Near syncope 12/14/2015    MICHELLE (obstructive sleep apnea)     Otitis externa 5/30/2017    PE (pulmonary thromboembolism) ([facility] [address] [address] 05/25/2017    Bilateral PEs     [facility]ism [facility]     [facility]    Pulmonary embolus ([facility] [address] [address] 5/30/2017    Syncope 12/14/2015    Overview:  S/p Biotronik pacemaker 2014    TIA (transient ischemic attack) 09/2016       Past Surgical History:   Procedure Laterality Date    MALIGNANT SKIN LESION EXCISION  2012    PACEMAKER INSERTION  09/2014    VASECTOMY  09/2014        Family History   Problem Relation Age of Onset    Diabetes Mother     Cancer Brother         Bone Cancer Osteocarcinoma 1991       Social History     Socioeconomic History    Marital status:      Spouse name: Not on file    Number of children: Not on file    Years of education: Not on file    Highest education level: Not on file

## 2023-08-23 ENCOUNTER — TELEPHONE (OUTPATIENT)
Dept: FAMILY MEDICINE CLINIC | Age: 53
End: 2023-08-23

## 2023-08-23 NOTE — TELEPHONE ENCOUNTER
After discussing with Dr Jessica Acosta, oatient instructed to go to ER. He will go [facility] ER. Patient called this am.  He had an episode this am at work where he passed out. EMS was called. EMT told him he was in AFIB. He has history of Cardiogenic Syncope. He is on his way home now- did not go to ER. He is 50 minutes away. He did not want to go to ER. He wants your opinion. We are to [place] back at [phone]. I encouraged ER or seeing someone else today but he wants your recommendation.

## 2023-10-02 ENCOUNTER — OFFICE VISIT (OUTPATIENT)
Dept: FAMILY MEDICINE CLINIC | Age: 53
End: 2023-10-02
Payer: COMMERCIAL

## 2023-10-02 VITALS
TEMPERATURE: 97.9 F | HEIGHT: 76 IN | WEIGHT: 254 LBS | DIASTOLIC BLOOD PRESSURE: 74 MMHG | HEART RATE: 95 BPM | OXYGEN SATURATION: 97 % | RESPIRATION RATE: 16 BRPM | BODY MASS INDEX: 30.93 KG/M2 | SYSTOLIC BLOOD PRESSURE: 124 MMHG

## 2023-10-02 DIAGNOSIS — T50.905A MEDICATION SIDE EFFECT, INITIAL ENCOUNTER: Primary | ICD-10-CM

## 2023-10-02 PROCEDURE — 99212 OFFICE O/P EST SF 10 MIN: CPT | Performed by: NURSE PRACTITIONER

## 2023-10-02 ASSESSMENT — PATIENT HEALTH QUESTIONNAIRE - PHQ9
2. FEELING DOWN, DEPRESSED OR HOPELESS: 0
SUM OF ALL RESPONSES TO PHQ9 QUESTIONS 1 & 2: 1
5. POOR APPETITE OR OVEREATING: 3
1. LITTLE INTEREST OR PLEASURE IN DOING THINGS: 1
3. TROUBLE FALLING OR STAYING ASLEEP: 3
6. FEELING BAD ABOUT YOURSELF - OR THAT YOU ARE A FAILURE OR HAVE LET YOURSELF OR YOUR FAMILY DOWN: 0
9. THOUGHTS THAT YOU WOULD BE BETTER OFF DEAD, OR OF HURTING YOURSELF: 0
SUM OF ALL RESPONSES TO PHQ QUESTIONS 1-9: 10
SUM OF ALL RESPONSES TO PHQ QUESTIONS 1-9: 10
10. IF YOU CHECKED OFF ANY PROBLEMS, HOW DIFFICULT HAVE THESE PROBLEMS MADE IT FOR YOU TO DO YOUR WORK, TAKE CARE OF THINGS AT HOME, OR GET ALONG WITH OTHER PEOPLE: 1
8. MOVING OR SPEAKING SO SLOWLY THAT OTHER PEOPLE COULD HAVE NOTICED. OR THE OPPOSITE, BEING SO FIGETY OR RESTLESS THAT YOU HAVE BEEN MOVING AROUND A LOT MORE THAN USUAL: 0
4. FEELING TIRED OR HAVING LITTLE ENERGY: 3
SUM OF ALL RESPONSES TO PHQ QUESTIONS 1-9: 10
SUM OF ALL RESPONSES TO PHQ QUESTIONS 1-9: 10
7. TROUBLE CONCENTRATING ON THINGS, SUCH AS READING THE NEWSPAPER OR WATCHING TELEVISION: 0

## 2023-10-02 ASSESSMENT — ENCOUNTER SYMPTOMS
SORE THROAT: 1
SINUS PAIN: 1
RHINORRHEA: 1
VOMITING: 0
COUGH: 1
NAUSEA: 0
DIARRHEA: 0

## 2023-10-02 NOTE — PATIENT INSTRUCTIONS
Avoid all decongestants except for Coricidin HBP products. Off work note for today. Call tomorrow if you need another note. Follow up with primary care provider in 1 to 2 days if needed.

## 2024-01-02 DIAGNOSIS — K21.9 GASTROESOPHAGEAL REFLUX DISEASE WITHOUT ESOPHAGITIS: ICD-10-CM

## 2024-01-02 DIAGNOSIS — R55 NEUROCARDIOGENIC SYNCOPE: ICD-10-CM

## 2024-01-03 NOTE — TELEPHONE ENCOUNTER
Issa Peres is requesting a refill on the following medication(s):  Requested Prescriptions     Pending Prescriptions Disp Refills    citalopram (CELEXA) 20 MG tablet [Pharmacy Med Name: Citalopram Hydrobromide 20 MG Oral Tablet] 180 tablet 3     Sig: TAKE 1 TABLET BY MOUTH TWICE  DAILY    omeprazole (PRILOSEC) 20 MG delayed release capsule [Pharmacy Med Name: Omeprazole 20 MG Oral Capsule Delayed Release] 180 capsule 3     Sig: TAKE 1 CAPSULE BY MOUTH TWICE  DAILY       Last Visit Date (If Applicable):  10/2/2023    Next Visit Date:    Visit date not found

## 2024-01-04 RX ORDER — CITALOPRAM 20 MG/1
TABLET ORAL
Qty: 180 TABLET | Refills: 3 | Status: SHIPPED | OUTPATIENT
Start: 2024-01-04

## 2024-01-04 RX ORDER — OMEPRAZOLE 20 MG/1
CAPSULE, DELAYED RELEASE ORAL
Qty: 180 CAPSULE | Refills: 3 | Status: SHIPPED | OUTPATIENT
Start: 2024-01-04

## 2024-03-08 ENCOUNTER — OFFICE VISIT (OUTPATIENT)
Dept: FAMILY MEDICINE CLINIC | Age: 54
End: 2024-03-08
Payer: COMMERCIAL

## 2024-03-08 VITALS
HEART RATE: 99 BPM | DIASTOLIC BLOOD PRESSURE: 70 MMHG | BODY MASS INDEX: 31.45 KG/M2 | WEIGHT: 258.3 LBS | HEIGHT: 76 IN | SYSTOLIC BLOOD PRESSURE: 98 MMHG | OXYGEN SATURATION: 97 %

## 2024-03-08 DIAGNOSIS — G47.33 OSA (OBSTRUCTIVE SLEEP APNEA): ICD-10-CM

## 2024-03-08 DIAGNOSIS — R55 NEUROCARDIOGENIC SYNCOPE: Primary | ICD-10-CM

## 2024-03-08 PROCEDURE — 99214 OFFICE O/P EST MOD 30 MIN: CPT | Performed by: FAMILY MEDICINE

## 2024-03-08 SDOH — ECONOMIC STABILITY: FOOD INSECURITY: WITHIN THE PAST 12 MONTHS, THE FOOD YOU BOUGHT JUST DIDN'T LAST AND YOU DIDN'T HAVE MONEY TO GET MORE.: NEVER TRUE

## 2024-03-08 SDOH — ECONOMIC STABILITY: FOOD INSECURITY: WITHIN THE PAST 12 MONTHS, YOU WORRIED THAT YOUR FOOD WOULD RUN OUT BEFORE YOU GOT MONEY TO BUY MORE.: NEVER TRUE

## 2024-03-08 SDOH — ECONOMIC STABILITY: INCOME INSECURITY: HOW HARD IS IT FOR YOU TO PAY FOR THE VERY BASICS LIKE FOOD, HOUSING, MEDICAL CARE, AND HEATING?: NOT HARD AT ALL

## 2024-03-08 ASSESSMENT — PATIENT HEALTH QUESTIONNAIRE - PHQ9: DEPRESSION UNABLE TO ASSESS: PT REFUSES

## 2024-03-08 NOTE — PROGRESS NOTES
HPI:  Patient comes in today for   Chief Complaint   Patient presents with    Loss of Consciousness     Pt is here for syncope. Pt states he does have cardiac syncope. Pt states the flair up started Wednesday night.    Here with c/o syncopal epidose yesterday feels like he is going to pass out has   h/o cardiogenic syncope was taken off florinef recently by cardiology and is  on midodrine also has pacemaker in place.Denies any chest pain or shortness of breath.H/o MICHELLE was on CPAP before is not using it the past 5 years since he lost some weight and also was intolerant to the mask so was given a script for new mask last visit.Prior h/o PE's last episode was 5 months ago following Covid  is on Eliquis long term. .  HISTORY:  Past Medical History:   Diagnosis Date    Heart palpitations     Long term current use of anticoagulant therapy 5/30/2017     Updating Deprecated Diagnoses    Near syncope 12/14/2015    MICHELLE (obstructive sleep apnea)     Otitis externa 5/30/2017    PE (pulmonary throm[facility]embolism) ([facility] 05/25/2017    Bilateral PEs     Pulmonary embolism (HCC)     [facility] - ? post procedural    Pulmonary embolus (HCC) 5/30/2017    Syncope 12/14/2015    Overview:  S/p Biotronik pacemaker 2014    TIA (transient ischemic attack) 09/2016       Past Surgical History:   Procedure Laterality Date    MALIGNANT SKIN LESION EXCISION  2012    PACEMAKER INSERTION  09/2014    VASECTOMY  09/2014        Family History   Problem Relation Age of Onset    Diabetes Mother     Cancer Brother         Bone Cancer Osteocarcinoma 1991       Social History     Socioeconomic History    Marital status:      Spouse name: Not on file    Number of children: Not on file    Years of education: Not on file    Highest education level: Not on file   Occupational History    Not on file   Tobacco Use    Smoking status: Never    Smokeless tobacco: Never   Vaping Use    Vaping Use: Never used   Substance and Sexual Activity    Alcohol use:

## 2024-11-13 DIAGNOSIS — K21.9 GASTROESOPHAGEAL REFLUX DISEASE WITHOUT ESOPHAGITIS: ICD-10-CM

## 2024-11-13 DIAGNOSIS — R55 NEUROCARDIOGENIC SYNCOPE: ICD-10-CM

## 2024-11-14 RX ORDER — CITALOPRAM HYDROBROMIDE 20 MG/1
TABLET ORAL
Qty: 180 TABLET | Refills: 0 | Status: SHIPPED | OUTPATIENT
Start: 2024-11-14

## 2024-11-14 NOTE — TELEPHONE ENCOUNTER
Issa Peres is requesting a refill on the following medication(s):  Requested Prescriptions     Pending Prescriptions Disp Refills    omeprazole (PRILOSEC) 20 MG delayed release capsule [Pharmacy Med Name: Omeprazole 20 MG Oral Capsule Delayed Release] 180 capsule 3     Sig: TAKE 1 CAPSULE BY MOUTH TWICE  DAILY    citalopram (CELEXA) 20 MG tablet [Pharmacy Med Name: Citalopram Hydrobromide 20 MG Oral Tablet] 180 tablet 3     Sig: TAKE 1 TABLET BY MOUTH TWICE  DAILY       Last Visit Date (If Applicable):  3/8/2024    Next Visit Date:    Visit date not found

## 2025-01-20 DIAGNOSIS — K21.9 GASTROESOPHAGEAL REFLUX DISEASE WITHOUT ESOPHAGITIS: ICD-10-CM

## 2025-01-20 DIAGNOSIS — R55 NEUROCARDIOGENIC SYNCOPE: ICD-10-CM

## 2025-01-21 RX ORDER — CITALOPRAM HYDROBROMIDE 20 MG/1
TABLET ORAL
Qty: 180 TABLET | Refills: 3 | Status: SHIPPED | OUTPATIENT
Start: 2025-01-21

## 2025-01-21 NOTE — TELEPHONE ENCOUNTER
Issa Peres is requesting a refill on the following medication(s):  Requested Prescriptions     Pending Prescriptions Disp Refills    citalopram (CELEXA) 20 MG tablet [Pharmacy Med Name: Citalopram Hydrobromide 20 MG Oral Tablet] 180 tablet 3     Sig: TAKE 1 TABLET BY MOUTH TWICE  DAILY    omeprazole (PRILOSEC) 20 MG delayed release capsule [Pharmacy Med Name: Omeprazole 20 MG Oral Capsule Delayed Release] 180 capsule 3     Sig: TAKE 1 CAPSULE BY MOUTH TWICE  DAILY       Last Visit Date (If Applicable):  3/8/2024    Next Visit Date:    Visit date not found

## 2025-02-14 ENCOUNTER — OFFICE VISIT (OUTPATIENT)
Dept: FAMILY MEDICINE CLINIC | Age: 55
End: 2025-02-14

## 2025-02-14 VITALS
TEMPERATURE: 99.2 F | HEIGHT: 76 IN | OXYGEN SATURATION: 99 % | HEART RATE: 114 BPM | BODY MASS INDEX: 30.93 KG/M2 | WEIGHT: 254 LBS | SYSTOLIC BLOOD PRESSURE: 100 MMHG | DIASTOLIC BLOOD PRESSURE: 70 MMHG

## 2025-02-14 DIAGNOSIS — R55 NEUROCARDIOGENIC SYNCOPE: ICD-10-CM

## 2025-02-14 DIAGNOSIS — E78.00 HYPERCHOLESTEREMIA: ICD-10-CM

## 2025-02-14 DIAGNOSIS — R09.89 CHEST CONGESTION: ICD-10-CM

## 2025-02-14 DIAGNOSIS — Z12.5 PROSTATE CANCER SCREENING: ICD-10-CM

## 2025-02-14 DIAGNOSIS — R00.0 SINUS TACHYCARDIA: ICD-10-CM

## 2025-02-14 DIAGNOSIS — J40 BRONCHITIS: ICD-10-CM

## 2025-02-14 DIAGNOSIS — K21.9 GASTROESOPHAGEAL REFLUX DISEASE WITHOUT ESOPHAGITIS: ICD-10-CM

## 2025-02-14 DIAGNOSIS — G47.33 OSA (OBSTRUCTIVE SLEEP APNEA): ICD-10-CM

## 2025-02-14 LAB
INFLUENZA A ANTIGEN, POC: NEGATIVE
INFLUENZA B ANTIGEN, POC: NEGATIVE
LOT EXPIRE DATE: NORMAL
LOT KIT NUMBER: NORMAL
SARS-COV-2, POC: NORMAL
VALID INTERNAL CONTROL: NORMAL
VENDOR AND KIT NAME POC: NORMAL

## 2025-02-14 RX ORDER — DOXYCYCLINE HYCLATE 100 MG
100 TABLET ORAL 2 TIMES DAILY
Qty: 14 TABLET | Refills: 0 | Status: SHIPPED | OUTPATIENT
Start: 2025-02-14 | End: 2025-02-21

## 2025-02-14 RX ORDER — BENZONATATE 100 MG/1
100 CAPSULE ORAL 3 TIMES DAILY PRN
Qty: 30 CAPSULE | Refills: 0 | Status: SHIPPED | OUTPATIENT
Start: 2025-02-14 | End: 2025-02-24

## 2025-02-14 RX ORDER — PANTOPRAZOLE SODIUM 40 MG/1
40 TABLET, DELAYED RELEASE ORAL
Qty: 30 TABLET | Refills: 2 | Status: SHIPPED | OUTPATIENT
Start: 2025-02-14

## 2025-02-14 NOTE — PROGRESS NOTES
HPI:  Patient comes in today for   Chief Complaint   Patient presents with    Chest Congestion     Pt is here for chest congestion,ear pain, fever. Pt states that that the symptoms started 3 days ago.     Gastroesophageal Reflux     Pt states he had the flu in December and since then his acid reflux has gotten worse.    Here with c/o cough chest congestion and fever since last few days.no shortness of breath or body aches ,has pressure in ears and sinus drainage.h/o GERD is on omeprazole symptoms have worsened since he had flu symptoms in December.  h/o cardiogenic syncope was taken off florinef  by cardiology and is  on midodrine also has pacemaker in place.Denies any chest pain or shortness of breath.H/o MICHELLE was on CPAP before is not using it the past 5-6 years since he lost some weight and also was intolerant to the mask . h/o PE's last episode was following Covid  is on Eliquis long term. .  HISTORY:  Past Medical History:   Diagnosis Date    Heart palpitations     Long term current use of anticoagulant therapy 5/30/2017     Updating Deprecated Diagnoses    Near syncope 12/14/2015    MICHELLE (obstructive sleep apnea)     Otitis externa 5/30/2017    PE (pulmonary thromboembolism) (HCC) 05/25/2017    Bilateral PEs     Pulmonary embolism (HCC)     [facility] - ? post procedural    Pulmonary embolus (HCC) 5/30/2017    Syncope 12/14/2015    Overview:  S/p Biotronik pacemaker 2014    TIA (transient ischemic attack) 09/2016       Past Surgical History:   Procedure Laterality Date    MALIGNANT SKIN LESION EXCISION  2012    PACEMAKER INSERTION  09/2014    VASECTOMY  09/2014        Family History   Problem Relation Age of Onset    Diabetes Mother     Cancer Brother         Bone Cancer Osteocarcinoma 1991       Social History     Socioeconomic History    Marital status:      Spouse name: Not on file    Number of children: Not on file    Years of education: Not on file    Highest education level: Not on file

## 2025-03-08 LAB
ALBUMIN/GLOBULIN RATIO: 1.5 G/DL
ALBUMIN: 4.6 G/DL (ref 3.5–5)
ALP BLD-CCNC: 74 UNITS/L (ref 38–126)
ALT SERPL-CCNC: 33 UNITS/L (ref 4–50)
ANION GAP SERPL CALCULATED.3IONS-SCNC: 9.7 MMOL/L (ref 3–11)
AST SERPL-CCNC: 32 UNITS/L (ref 17–59)
BILIRUB SERPL-MCNC: 0.7 MG/DL (ref 0.2–1.3)
BUN BLDV-MCNC: 17 MG/DL (ref 9–20)
CALCIUM SERPL-MCNC: 10.3 MG/DL (ref 8.4–10.2)
CHLORIDE BLD-SCNC: 106 MMOL/L (ref 98–120)
CHOLESTEROL, TOTAL: 246 MG/DL (ref 50–200)
CHOLESTEROL/HDL RATIO: 5.72 RATIO (ref 0–4.5)
CO2: 25 MMOL/L (ref 22–31)
CREAT SERPL-MCNC: 1 MG/DL (ref 0.7–1.3)
DIAGNOSTIC PSA: 2.56 NG/ML (ref 0–4)
GFR, ESTIMATED: > 60
GLOBULIN: 3.1 G/DL
GLUCOSE: 93 MG/DL (ref 75–110)
HCT VFR BLD CALC: 50.3 % (ref 42–52)
HDLC SERPL-MCNC: 43 MG/DL (ref 36–68)
HEMOGLOBIN: 15.5 G/DL (ref 13.8–17.8)
LDL CHOLESTEROL: 180 MG/DL (ref 0–160)
MCH RBC QN AUTO: 25.4 PG (ref 28.5–32.5)
MCHC RBC AUTO-ENTMCNC: 30.9 G/DL (ref 32–37)
MCV RBC AUTO: 82.2 FL (ref 80–94)
PDW BLD-RTO: 13 % (ref 10–15.5)
PLATELET # BLD: 320.7 THOU/MM3 (ref 130–400)
POTASSIUM SERPL-SCNC: 4.8 MMOL/L (ref 3.6–5)
RBC # BLD: 6.12 M/UL (ref 4.7–6.1)
SODIUM BLD-SCNC: 140 MMOL/L (ref 135–145)
TOTAL PROTEIN: 7.7 G/DL (ref 6.3–8.2)
TRIGL SERPL-MCNC: 115 MG/DL (ref 10–250)
TSH SERPL DL<=0.05 MIU/L-ACNC: 1.04 MIU/ML (ref 0.49–4.67)
VLDLC SERPL CALC-MCNC: 23 MG/DL (ref 0–50)
WBC # BLD: 7.3 THOU/ML3 (ref 4.8–10.8)

## 2025-03-14 ENCOUNTER — TELEPHONE (OUTPATIENT)
Dept: FAMILY MEDICINE CLINIC | Age: 55
End: 2025-03-14

## 2025-03-14 NOTE — TELEPHONE ENCOUNTER
Patient called states his heart burn is really bad at night.  During the day it is fine.  States the Acid reflex is done just having burning in his esophagus that is waking him up at night.

## 2025-03-18 RX ORDER — PANTOPRAZOLE SODIUM 40 MG/1
40 TABLET, DELAYED RELEASE ORAL 2 TIMES DAILY
Qty: 60 TABLET | Refills: 1 | Status: SHIPPED | OUTPATIENT
Start: 2025-03-18

## 2025-03-18 NOTE — TELEPHONE ENCOUNTER
Pt aware. Pt scheduled an appointment on Friday. Pt started doing 2x daily 5 days ago and the burning stopped but still feeling it in his throat.

## 2025-03-21 ENCOUNTER — OFFICE VISIT (OUTPATIENT)
Dept: FAMILY MEDICINE CLINIC | Age: 55
End: 2025-03-21

## 2025-03-21 VITALS
BODY MASS INDEX: 30.92 KG/M2 | WEIGHT: 254 LBS | DIASTOLIC BLOOD PRESSURE: 82 MMHG | SYSTOLIC BLOOD PRESSURE: 115 MMHG | OXYGEN SATURATION: 96 % | HEART RATE: 91 BPM

## 2025-03-21 DIAGNOSIS — E78.00 HYPERCHOLESTEREMIA: ICD-10-CM

## 2025-03-21 DIAGNOSIS — K21.9 GASTROESOPHAGEAL REFLUX DISEASE WITHOUT ESOPHAGITIS: Primary | ICD-10-CM

## 2025-03-21 DIAGNOSIS — R55 NEUROCARDIOGENIC SYNCOPE: ICD-10-CM

## 2025-03-21 DIAGNOSIS — H60.502 ACUTE OTITIS EXTERNA OF LEFT EAR, UNSPECIFIED TYPE: ICD-10-CM

## 2025-03-21 RX ORDER — PRAVASTATIN SODIUM 40 MG
40 TABLET ORAL EVERY EVENING
Qty: 30 TABLET | Refills: 3 | Status: SHIPPED | OUTPATIENT
Start: 2025-03-21

## 2025-03-21 RX ORDER — NEOMYCIN SULFATE, POLYMYXIN B SULFATE, HYDROCORTISONE 3.5; 10000; 1 MG/ML; [USP'U]/ML; MG/ML
4 SOLUTION/ DROPS AURICULAR (OTIC) 3 TIMES DAILY
Qty: 10 ML | Refills: 0 | Status: SHIPPED | OUTPATIENT
Start: 2025-03-21 | End: 2025-03-31

## 2025-03-21 RX ORDER — FAMOTIDINE 20 MG/1
20 TABLET, FILM COATED ORAL NIGHTLY
Qty: 30 TABLET | Refills: 2 | Status: SHIPPED | OUTPATIENT
Start: 2025-03-21

## 2025-03-21 SDOH — ECONOMIC STABILITY: FOOD INSECURITY: WITHIN THE PAST 12 MONTHS, THE FOOD YOU BOUGHT JUST DIDN'T LAST AND YOU DIDN'T HAVE MONEY TO GET MORE.: NEVER TRUE

## 2025-03-21 SDOH — ECONOMIC STABILITY: FOOD INSECURITY: WITHIN THE PAST 12 MONTHS, YOU WORRIED THAT YOUR FOOD WOULD RUN OUT BEFORE YOU GOT MONEY TO BUY MORE.: NEVER TRUE

## 2025-03-21 ASSESSMENT — PATIENT HEALTH QUESTIONNAIRE - PHQ9
10. IF YOU CHECKED OFF ANY PROBLEMS, HOW DIFFICULT HAVE THESE PROBLEMS MADE IT FOR YOU TO DO YOUR WORK, TAKE CARE OF THINGS AT HOME, OR GET ALONG WITH OTHER PEOPLE: NOT DIFFICULT AT ALL
SUM OF ALL RESPONSES TO PHQ QUESTIONS 1-9: 0
6. FEELING BAD ABOUT YOURSELF - OR THAT YOU ARE A FAILURE OR HAVE LET YOURSELF OR YOUR FAMILY DOWN: NOT AT ALL
7. TROUBLE CONCENTRATING ON THINGS, SUCH AS READING THE NEWSPAPER OR WATCHING TELEVISION: NOT AT ALL
SUM OF ALL RESPONSES TO PHQ QUESTIONS 1-9: 0
4. FEELING TIRED OR HAVING LITTLE ENERGY: NOT AT ALL
9. THOUGHTS THAT YOU WOULD BE BETTER OFF DEAD, OR OF HURTING YOURSELF: NOT AT ALL
SUM OF ALL RESPONSES TO PHQ QUESTIONS 1-9: 0
8. MOVING OR SPEAKING SO SLOWLY THAT OTHER PEOPLE COULD HAVE NOTICED. OR THE OPPOSITE, BEING SO FIGETY OR RESTLESS THAT YOU HAVE BEEN MOVING AROUND A LOT MORE THAN USUAL: NOT AT ALL
SUM OF ALL RESPONSES TO PHQ QUESTIONS 1-9: 0
1. LITTLE INTEREST OR PLEASURE IN DOING THINGS: NOT AT ALL
5. POOR APPETITE OR OVEREATING: NOT AT ALL
3. TROUBLE FALLING OR STAYING ASLEEP: NOT AT ALL
2. FEELING DOWN, DEPRESSED OR HOPELESS: NOT AT ALL

## 2025-03-21 NOTE — PROGRESS NOTES
Reactions    Rivaroxaban Other (See Comments)    Adhesive Tape     Azithromycin     Erythromycin      Hand swelling    Influenza Vaccines      SVT    Lipitor [Atorvastatin]      bruising    Nitroglycerin      Syncopal episode    Nubain [Nalbuphine]      Hand swelling    Other      Adhesive tape , skin tear     Valium [Diazepam]      Does not like how he felt    Amoxicillin Rash       REVIEW OF SYSTEMS:  General: No fevers, chills, change in weight  HEENT: No double vision, blurry vision, runny nose, sore throat, tinnitus  Cardio: No chest pain, palpitations, LERMA, edema, PND  Pulmonary: No cough, hemoptysis, SOB  GI: No nausea, vomiting, dysphagia, odynophagia, diarrhea, constipation.h/o hemorrhoids  : No dysuria, hematuria, urgency, incontinence  Musculoskeletal: No muscle or joint aches, no joint swelling  Neuro: No dizziness/lightheadedness, no seizures  Endocrine: No polyuria, polydipsia, polyphagia, no temperature intolerance  Skin: No lesions or itching  No problems with ADLs  Sleep: Fair has MICHELLE not using CPAP  Psychiatric: No depression or anxiety    PHYSICAL EXAM:  VS:  /82 (BP Site: Right Upper Arm, Patient Position: Sitting, BP Cuff Size: Large Adult)   Pulse 91   Wt 115.2 kg (254 lb)   SpO2 96%   BMI 30.92 kg/m²   General:  Alert and oriented, NAD  HEENT:  TMs, ear canals are dry on both sides,SHERINE, EOMI, Conjunctivae clear Throat currently clear.  NECK:  Supple without adenopathy or thyromegaly, no carotid bruits  LUNGS:  CTA all fields  HEART:  RRR without M, R, or G  ABDOMEN:  Soft and nontender without palpable abnormalities  EXTREMITIES:  Without clubbing, cyanosis, or edema, no calf tenderness  NEURO:  No focal deficits.  SKIN:  warm to touch,normal texture.No active lesions.    ASSESSMENT/PLAN:     Diagnosis Orders   1. Gastroesophageal reflux disease without esophagitis        2. Acute otitis externa of left ear, unspecified type        3. Neurocardiogenic syncope        4.

## 2025-04-21 ENCOUNTER — TELEPHONE (OUTPATIENT)
Dept: FAMILY MEDICINE CLINIC | Age: 55
End: 2025-04-21

## 2025-04-21 ENCOUNTER — OFFICE VISIT (OUTPATIENT)
Dept: FAMILY MEDICINE CLINIC | Age: 55
End: 2025-04-21
Payer: COMMERCIAL

## 2025-04-21 VITALS
HEART RATE: 83 BPM | OXYGEN SATURATION: 98 % | HEIGHT: 76 IN | WEIGHT: 256 LBS | BODY MASS INDEX: 31.17 KG/M2 | DIASTOLIC BLOOD PRESSURE: 80 MMHG | SYSTOLIC BLOOD PRESSURE: 115 MMHG

## 2025-04-21 DIAGNOSIS — K21.9 GASTROESOPHAGEAL REFLUX DISEASE WITHOUT ESOPHAGITIS: ICD-10-CM

## 2025-04-21 DIAGNOSIS — G47.33 OSA (OBSTRUCTIVE SLEEP APNEA): ICD-10-CM

## 2025-04-21 DIAGNOSIS — E78.00 HYPERCHOLESTEREMIA: Primary | ICD-10-CM

## 2025-04-21 DIAGNOSIS — R55 NEUROCARDIOGENIC SYNCOPE: ICD-10-CM

## 2025-04-21 PROCEDURE — 99214 OFFICE O/P EST MOD 30 MIN: CPT | Performed by: FAMILY MEDICINE

## 2025-04-21 RX ORDER — PANTOPRAZOLE SODIUM 40 MG/1
40 TABLET, DELAYED RELEASE ORAL 2 TIMES DAILY
Qty: 180 TABLET | Refills: 1 | Status: SHIPPED | OUTPATIENT
Start: 2025-04-21

## 2025-04-21 RX ORDER — FAMOTIDINE 20 MG/1
20 TABLET, FILM COATED ORAL NIGHTLY
Qty: 90 TABLET | Refills: 1 | Status: SHIPPED | OUTPATIENT
Start: 2025-04-21

## 2025-04-21 NOTE — PROGRESS NOTES
HPI:  Patient comes in today for   Chief Complaint   Patient presents with    Follow-up     Pt is here for a follow up. Pt states that is acid reflux is doing a lot better.    Here with c/o acid reflux is on protonix has increased it to twice daily and was aded on pepcid nightly is helping,h/o Hypercholesterolemia was switched to Pravachol due to concerns for side effects while on crestor is tolerating prvachol.Also  c/o crackling in both ears,no drainage ,hearing is good. h/o cardiogenic syncope was taken off florinef  by cardiology and is  on Droxidropa and midodrine as needed also has pacemaker in place.Denies any chest pain or shortness of breath.H/o MICHELLE was on CPAP before is not using it the past 5-6 years since he lost some weight and also was intolerant to the mask . h/o PE's last episode was following Covid  is on Eliquis long term. .  HISTORY:  Past Medical History:   Diagnosis Date    Heart palpitations     Long term current use of anticoagulant therapy 5/30/2017     Updating Deprecated Diagnoses    Near syncope 12/14/2015    MICHELLE (obstructive sleep apnea)     Otitis externa 5/30/2017    PE (pulmonary thromboembolism) ([facility]) 05/25/2017    Bilateral PEs     Pulmonary embolism (HCC)     [facility] - ? post procedural    Pulmonary embolus (HCC) 5/30/2017    Syncope 12/14/2015    Overview:  S/p Biotronik pacemaker 2014    TIA (transient ischemic attack) 09/2016       Past Surgical History:   Procedure Laterality Date    MALIGNANT SKIN LESION EXCISION  2012    PACEMAKER INSERTION  09/2014    VASECTOMY  09/2014        Family History   Problem Relation Age of Onset    Diabetes Mother     Cancer Brother         Bone Cancer Osteocarcinoma 1991       Social History     Socioeconomic History    Marital status:      Spouse name: Not on file    Number of children: Not on file    Years of education: Not on file    Highest education level: Not on file   Occupational History    Not on file   Tobacco Use

## 2025-05-13 RX ORDER — PANTOPRAZOLE SODIUM 40 MG/1
TABLET, DELAYED RELEASE ORAL
Qty: 30 TABLET | Refills: 0 | Status: SHIPPED | OUTPATIENT
Start: 2025-05-13

## 2025-05-13 NOTE — TELEPHONE ENCOUNTER
Issa Peres is requesting a refill on the following medication(s):  Requested Prescriptions     Pending Prescriptions Disp Refills    pantoprazole (PROTONIX) 40 MG tablet [Pharmacy Med Name: Pantoprazole Sodium 40 MG Oral Tablet Delayed Release] 30 tablet 0     Sig: TAKE 1 TABLET BY MOUTH ONCE DAILY IN THE MORNING BEFORE BREAKFAST       Last Visit Date (If Applicable):  4/21/2025    Next Visit Date:    Visit date not found

## 2025-06-23 RX ORDER — PANTOPRAZOLE SODIUM 40 MG/1
40 TABLET, DELAYED RELEASE ORAL DAILY
Qty: 30 TABLET | Refills: 1 | Status: SHIPPED | OUTPATIENT
Start: 2025-06-23

## 2025-06-23 NOTE — TELEPHONE ENCOUNTER
Issa Peres is requesting a refill on the following medication(s):  Requested Prescriptions     Pending Prescriptions Disp Refills    pantoprazole (PROTONIX) 40 MG tablet 30 tablet 0       Last Visit Date (If Applicable):  4/21/2025    Next Visit Date:    Visit date not found

## 2025-07-07 NOTE — TELEPHONE ENCOUNTER
Issa Peres is requesting a refill on the following medication(s):  Requested Prescriptions     Pending Prescriptions Disp Refills    pantoprazole (PROTONIX) 40 MG tablet 180 tablet 0     Sig: Take 1 tablet by mouth in the morning and at bedtime       Last Visit Date (If Applicable):  4/21/2025    Next Visit Date:    Visit date not found

## 2025-07-08 RX ORDER — PANTOPRAZOLE SODIUM 40 MG/1
40 TABLET, DELAYED RELEASE ORAL 2 TIMES DAILY
Qty: 180 TABLET | Refills: 0 | Status: SHIPPED | OUTPATIENT
Start: 2025-07-08

## 2025-07-11 RX ORDER — PRAVASTATIN SODIUM 40 MG
40 TABLET ORAL NIGHTLY
Qty: 30 TABLET | Refills: 0 | Status: SHIPPED | OUTPATIENT
Start: 2025-07-11

## 2025-07-11 NOTE — TELEPHONE ENCOUNTER
Issa Peres is requesting a refill on the following medication(s):  Requested Prescriptions     Pending Prescriptions Disp Refills    pravastatin (PRAVACHOL) 40 MG tablet [Pharmacy Med Name: PRAVASTATIN 40MG    TAB] 30 tablet 0     Sig: TAKE 1 TABLET BY MOUTH ONCE DAILY IN THE EVENING       Last Visit Date (If Applicable):  4/21/2025    Next Visit Date:    Visit date not found

## 2025-08-08 RX ORDER — PRAVASTATIN SODIUM 40 MG
40 TABLET ORAL NIGHTLY
Qty: 30 TABLET | Refills: 0 | Status: SHIPPED | OUTPATIENT
Start: 2025-08-08

## 2025-08-12 ENCOUNTER — APPOINTMENT (OUTPATIENT)
Dept: CT IMAGING | Facility: CLINIC | Age: 55
End: 2025-08-12
Payer: COMMERCIAL

## 2025-08-12 ENCOUNTER — APPOINTMENT (OUTPATIENT)
Dept: ULTRASOUND IMAGING | Facility: CLINIC | Age: 55
End: 2025-08-12
Payer: COMMERCIAL

## 2025-08-12 ENCOUNTER — HOSPITAL ENCOUNTER (EMERGENCY)
Facility: CLINIC | Age: 55
Discharge: HOME | End: 2025-08-12
Attending: EMERGENCY MEDICINE
Payer: COMMERCIAL

## 2025-08-12 VITALS
RESPIRATION RATE: 16 BRPM | DIASTOLIC BLOOD PRESSURE: 76 MMHG | BODY MASS INDEX: 30.32 KG/M2 | WEIGHT: 249.1 LBS | OXYGEN SATURATION: 99 % | SYSTOLIC BLOOD PRESSURE: 125 MMHG | TEMPERATURE: 98 F | HEART RATE: 78 BPM

## 2025-08-12 DIAGNOSIS — I82.90 CLOT: ICD-10-CM

## 2025-08-12 DIAGNOSIS — I26.99 BILATERAL PULMONARY EMBOLISM (HCC): Primary | ICD-10-CM

## 2025-08-12 LAB
ANION GAP SERPL CALCULATED.3IONS-SCNC: 15 MMOL/L (ref 9–16)
BASOPHILS # BLD: 0.1 K/UL (ref 0–0.2)
BASOPHILS NFR BLD: 1 % (ref 0–2)
BNP SERPL-MCNC: <36 PG/ML (ref 0–300)
BUN SERPL-MCNC: 13 MG/DL (ref 6–20)
CALCIUM SERPL-MCNC: 10.1 MG/DL (ref 8.6–10.4)
CHLORIDE SERPL-SCNC: 102 MMOL/L (ref 98–107)
CO2 SERPL-SCNC: 24 MMOL/L (ref 20–31)
CREAT SERPL-MCNC: 1 MG/DL (ref 0.7–1.2)
EOSINOPHIL # BLD: 0.4 K/UL (ref 0–0.4)
EOSINOPHILS RELATIVE PERCENT: 4 % (ref 1–4)
ERYTHROCYTE [DISTWIDTH] IN BLOOD BY AUTOMATED COUNT: 14.8 % (ref 12.5–15.4)
GFR, ESTIMATED: 89 ML/MIN/1.73M2
GLUCOSE SERPL-MCNC: 104 MG/DL (ref 74–99)
HCT VFR BLD AUTO: 44.1 % (ref 41–53)
HGB BLD-MCNC: 14.9 G/DL (ref 13.5–17.5)
INR PPP: 0.9
LYMPHOCYTES NFR BLD: 1.8 K/UL (ref 1–4.8)
LYMPHOCYTES RELATIVE PERCENT: 18 % (ref 24–44)
MCH RBC QN AUTO: 27.1 PG (ref 26–34)
MCHC RBC AUTO-ENTMCNC: 33.8 G/DL (ref 31–37)
MCV RBC AUTO: 80.2 FL (ref 80–100)
MONOCYTES NFR BLD: 1.2 K/UL (ref 0.1–1.2)
MONOCYTES NFR BLD: 12 % (ref 2–11)
NEUTROPHILS NFR BLD: 65 % (ref 36–66)
NEUTS SEG NFR BLD: 6.6 K/UL (ref 1.8–7.7)
PARTIAL THROMBOPLASTIN TIME: 24.5 SEC (ref 21.3–31.3)
PLATELET # BLD AUTO: 276 K/UL (ref 140–450)
PMV BLD AUTO: 8.2 FL (ref 6–12)
POTASSIUM SERPL-SCNC: 4.7 MMOL/L (ref 3.7–5.3)
PROTHROMBIN TIME: 9.5 SEC (ref 9.4–12.6)
RBC # BLD AUTO: 5.5 M/UL (ref 4.5–5.9)
SODIUM SERPL-SCNC: 141 MMOL/L (ref 136–145)
TROPONIN I SERPL HS-MCNC: <6 NG/L (ref 0–22)
WBC OTHER # BLD: 10 K/UL (ref 3.5–11)

## 2025-08-12 PROCEDURE — 83880 ASSAY OF NATRIURETIC PEPTIDE: CPT

## 2025-08-12 PROCEDURE — 85730 THROMBOPLASTIN TIME PARTIAL: CPT

## 2025-08-12 PROCEDURE — 6360000002 HC RX W HCPCS

## 2025-08-12 PROCEDURE — 6370000000 HC RX 637 (ALT 250 FOR IP)

## 2025-08-12 PROCEDURE — 6370000000 HC RX 637 (ALT 250 FOR IP): Performed by: EMERGENCY MEDICINE

## 2025-08-12 PROCEDURE — 93005 ELECTROCARDIOGRAM TRACING: CPT | Performed by: EMERGENCY MEDICINE

## 2025-08-12 PROCEDURE — 84484 ASSAY OF TROPONIN QUANT: CPT

## 2025-08-12 PROCEDURE — 6360000004 HC RX CONTRAST MEDICATION: Performed by: EMERGENCY MEDICINE

## 2025-08-12 PROCEDURE — 71260 CT THORAX DX C+: CPT

## 2025-08-12 PROCEDURE — 99285 EMERGENCY DEPT VISIT HI MDM: CPT

## 2025-08-12 PROCEDURE — 2580000003 HC RX 258

## 2025-08-12 PROCEDURE — 96374 THER/PROPH/DIAG INJ IV PUSH: CPT

## 2025-08-12 PROCEDURE — 80048 BASIC METABOLIC PNL TOTAL CA: CPT

## 2025-08-12 PROCEDURE — 85610 PROTHROMBIN TIME: CPT

## 2025-08-12 PROCEDURE — 85025 COMPLETE CBC W/AUTO DIFF WBC: CPT

## 2025-08-12 PROCEDURE — 93970 EXTREMITY STUDY: CPT

## 2025-08-12 PROCEDURE — 2500000003 HC RX 250 WO HCPCS: Performed by: EMERGENCY MEDICINE

## 2025-08-12 RX ORDER — ASPIRIN 325 MG
325 TABLET ORAL ONCE
Status: COMPLETED | OUTPATIENT
Start: 2025-08-12 | End: 2025-08-12

## 2025-08-12 RX ORDER — 0.9 % SODIUM CHLORIDE 0.9 %
80 INTRAVENOUS SOLUTION INTRAVENOUS ONCE
Status: DISCONTINUED | OUTPATIENT
Start: 2025-08-12 | End: 2025-08-12 | Stop reason: HOSPADM

## 2025-08-12 RX ORDER — HEPARIN SODIUM 10000 [USP'U]/100ML
18 INJECTION, SOLUTION INTRAVENOUS CONTINUOUS
Status: DISCONTINUED | OUTPATIENT
Start: 2025-08-12 | End: 2025-08-12

## 2025-08-12 RX ORDER — HEPARIN SODIUM 1000 [USP'U]/ML
40 INJECTION, SOLUTION INTRAVENOUS; SUBCUTANEOUS PRN
Status: DISCONTINUED | OUTPATIENT
Start: 2025-08-12 | End: 2025-08-12

## 2025-08-12 RX ORDER — IOPAMIDOL 755 MG/ML
75 INJECTION, SOLUTION INTRAVASCULAR
Status: COMPLETED | OUTPATIENT
Start: 2025-08-12 | End: 2025-08-12

## 2025-08-12 RX ORDER — DOXYCYCLINE HYCLATE 100 MG
100 TABLET ORAL 2 TIMES DAILY
Qty: 14 TABLET | Refills: 0 | Status: SHIPPED | OUTPATIENT
Start: 2025-08-12 | End: 2025-08-19

## 2025-08-12 RX ORDER — 0.9 % SODIUM CHLORIDE 0.9 %
1000 INTRAVENOUS SOLUTION INTRAVENOUS ONCE
Status: COMPLETED | OUTPATIENT
Start: 2025-08-12 | End: 2025-08-12

## 2025-08-12 RX ORDER — HEPARIN SODIUM 1000 [USP'U]/ML
80 INJECTION, SOLUTION INTRAVENOUS; SUBCUTANEOUS ONCE
Status: DISCONTINUED | OUTPATIENT
Start: 2025-08-12 | End: 2025-08-12

## 2025-08-12 RX ORDER — SODIUM CHLORIDE 0.9 % (FLUSH) 0.9 %
10 SYRINGE (ML) INJECTION PRN
Status: DISCONTINUED | OUTPATIENT
Start: 2025-08-12 | End: 2025-08-12 | Stop reason: HOSPADM

## 2025-08-12 RX ORDER — KETOROLAC TROMETHAMINE 30 MG/ML
30 INJECTION, SOLUTION INTRAMUSCULAR; INTRAVENOUS ONCE
Status: COMPLETED | OUTPATIENT
Start: 2025-08-12 | End: 2025-08-12

## 2025-08-12 RX ORDER — HEPARIN SODIUM 1000 [USP'U]/ML
80 INJECTION, SOLUTION INTRAVENOUS; SUBCUTANEOUS PRN
Status: DISCONTINUED | OUTPATIENT
Start: 2025-08-12 | End: 2025-08-12

## 2025-08-12 RX ADMIN — ASPIRIN 325 MG: 325 TABLET ORAL at 12:08

## 2025-08-12 RX ADMIN — IOPAMIDOL 75 ML: 755 INJECTION, SOLUTION INTRAVENOUS at 12:20

## 2025-08-12 RX ADMIN — Medication 80 ML: at 12:20

## 2025-08-12 RX ADMIN — KETOROLAC TROMETHAMINE 30 MG: 30 INJECTION, SOLUTION INTRAMUSCULAR; INTRAVENOUS at 13:45

## 2025-08-12 RX ADMIN — SODIUM CHLORIDE, PRESERVATIVE FREE 10 ML: 5 INJECTION INTRAVENOUS at 12:20

## 2025-08-12 RX ADMIN — SODIUM CHLORIDE 1000 ML: 0.9 INJECTION, SOLUTION INTRAVENOUS at 12:09

## 2025-08-12 RX ADMIN — APIXABAN 10 MG: 5 TABLET, FILM COATED ORAL at 15:18

## 2025-08-12 ASSESSMENT — PAIN - FUNCTIONAL ASSESSMENT
PAIN_FUNCTIONAL_ASSESSMENT: 0-10
PAIN_FUNCTIONAL_ASSESSMENT: 0-10
PAIN_FUNCTIONAL_ASSESSMENT: ACTIVITIES ARE NOT PREVENTED

## 2025-08-12 ASSESSMENT — ENCOUNTER SYMPTOMS
WHEEZING: 0
RHINORRHEA: 0
NAUSEA: 0
COUGH: 0
SORE THROAT: 0
SHORTNESS OF BREATH: 1
EYE PAIN: 0
COLOR CHANGE: 0
EYE REDNESS: 0
ABDOMINAL PAIN: 0
DIARRHEA: 0
VOMITING: 0
CHEST TIGHTNESS: 1
PHOTOPHOBIA: 0
CONSTIPATION: 0

## 2025-08-12 ASSESSMENT — PAIN DESCRIPTION - FREQUENCY: FREQUENCY: INTERMITTENT

## 2025-08-12 ASSESSMENT — PAIN SCALES - GENERAL
PAINLEVEL_OUTOF10: 10
PAINLEVEL_OUTOF10: 0
PAINLEVEL_OUTOF10: 9

## 2025-08-12 ASSESSMENT — PAIN DESCRIPTION - ORIENTATION: ORIENTATION: LEFT

## 2025-08-12 ASSESSMENT — PAIN DESCRIPTION - PAIN TYPE: TYPE: ACUTE PAIN

## 2025-08-12 ASSESSMENT — PAIN DESCRIPTION - LOCATION: LOCATION: SHOULDER;CHEST

## 2025-08-13 ENCOUNTER — TELEPHONE (OUTPATIENT)
Age: 55
End: 2025-08-13

## 2025-08-13 LAB
EKG ATRIAL RATE: 93 BPM
EKG P AXIS: 54 DEGREES
EKG P-R INTERVAL: 186 MS
EKG Q-T INTERVAL: 368 MS
EKG QRS DURATION: 94 MS
EKG QTC CALCULATION (BAZETT): 457 MS
EKG R AXIS: 43 DEGREES
EKG T AXIS: 47 DEGREES
EKG VENTRICULAR RATE: 93 BPM

## 2025-08-15 ENCOUNTER — OFFICE VISIT (OUTPATIENT)
Dept: FAMILY MEDICINE CLINIC | Age: 55
End: 2025-08-15
Payer: COMMERCIAL

## 2025-08-15 VITALS
HEIGHT: 76 IN | OXYGEN SATURATION: 98 % | BODY MASS INDEX: 30.2 KG/M2 | WEIGHT: 248 LBS | DIASTOLIC BLOOD PRESSURE: 70 MMHG | SYSTOLIC BLOOD PRESSURE: 100 MMHG | HEART RATE: 103 BPM

## 2025-08-15 DIAGNOSIS — R04.2 HEMOPTYSIS: ICD-10-CM

## 2025-08-15 DIAGNOSIS — E78.00 HYPERCHOLESTEREMIA: ICD-10-CM

## 2025-08-15 DIAGNOSIS — R55 NEUROCARDIOGENIC SYNCOPE: ICD-10-CM

## 2025-08-15 DIAGNOSIS — K21.9 GASTROESOPHAGEAL REFLUX DISEASE WITHOUT ESOPHAGITIS: ICD-10-CM

## 2025-08-15 DIAGNOSIS — I26.94 MULTIPLE SUBSEGMENTAL PULMONARY EMBOLI WITHOUT ACUTE COR PULMONALE (HCC): Primary | ICD-10-CM

## 2025-08-15 PROCEDURE — 99214 OFFICE O/P EST MOD 30 MIN: CPT | Performed by: FAMILY MEDICINE

## 2025-08-20 ENCOUNTER — OFFICE VISIT (OUTPATIENT)
Dept: FAMILY MEDICINE CLINIC | Age: 55
End: 2025-08-20
Payer: COMMERCIAL

## 2025-08-20 ENCOUNTER — HOSPITAL ENCOUNTER (OUTPATIENT)
Age: 55
Setting detail: SPECIMEN
Discharge: HOME | End: 2025-08-20
Payer: COMMERCIAL

## 2025-08-20 ENCOUNTER — HOSPITAL ENCOUNTER (OUTPATIENT)
Age: 55
Discharge: HOME | End: 2025-08-22
Payer: COMMERCIAL

## 2025-08-20 ENCOUNTER — APPOINTMENT (OUTPATIENT)
Dept: FAMILY MEDICINE CLINIC | Age: 55
End: 2025-08-20
Payer: COMMERCIAL

## 2025-08-20 VITALS
BODY MASS INDEX: 30.32 KG/M2 | WEIGHT: 249 LBS | HEART RATE: 84 BPM | OXYGEN SATURATION: 96 % | HEIGHT: 76 IN | SYSTOLIC BLOOD PRESSURE: 100 MMHG | DIASTOLIC BLOOD PRESSURE: 80 MMHG

## 2025-08-20 DIAGNOSIS — R55 NEUROCARDIOGENIC SYNCOPE: ICD-10-CM

## 2025-08-20 DIAGNOSIS — I27.82 OTHER CHRONIC PULMONARY EMBOLISM WITHOUT ACUTE COR PULMONALE (HCC): ICD-10-CM

## 2025-08-20 DIAGNOSIS — R05.2 SUBACUTE COUGH: ICD-10-CM

## 2025-08-20 DIAGNOSIS — E78.00 HYPERCHOLESTEREMIA: ICD-10-CM

## 2025-08-20 DIAGNOSIS — K21.9 GASTROESOPHAGEAL REFLUX DISEASE WITHOUT ESOPHAGITIS: ICD-10-CM

## 2025-08-20 LAB
ALT SERPL-CCNC: 24 U/L (ref 10–50)
AST SERPL-CCNC: 23 U/L (ref 10–50)
CHOLEST SERPL-MCNC: 129 MG/DL (ref 0–199)
CHOLESTEROL/HDL RATIO: 3.4
HDLC SERPL-MCNC: 38 MG/DL
LDLC SERPL CALC-MCNC: 77 MG/DL (ref 0–100)
TRIGL SERPL-MCNC: 68 MG/DL
VLDLC SERPL CALC-MCNC: 14 MG/DL (ref 1–30)

## 2025-08-20 PROCEDURE — 84450 TRANSFERASE (AST) (SGOT): CPT

## 2025-08-20 PROCEDURE — 71046 X-RAY EXAM CHEST 2 VIEWS: CPT

## 2025-08-20 PROCEDURE — 84460 ALANINE AMINO (ALT) (SGPT): CPT

## 2025-08-20 PROCEDURE — 99214 OFFICE O/P EST MOD 30 MIN: CPT | Performed by: FAMILY MEDICINE

## 2025-08-20 PROCEDURE — 80061 LIPID PANEL: CPT

## 2025-08-20 RX ORDER — BENZONATATE 200 MG/1
200 CAPSULE ORAL 3 TIMES DAILY PRN
Qty: 30 CAPSULE | Refills: 0 | Status: SHIPPED | OUTPATIENT
Start: 2025-08-20 | End: 2025-08-30

## 2025-08-25 RX ORDER — PANTOPRAZOLE SODIUM 40 MG/1
40 TABLET, DELAYED RELEASE ORAL 2 TIMES DAILY
Qty: 180 TABLET | Refills: 0 | Status: SHIPPED | OUTPATIENT
Start: 2025-08-25

## 2025-09-03 RX ORDER — PRAVASTATIN SODIUM 40 MG
40 TABLET ORAL NIGHTLY
Qty: 30 TABLET | Refills: 0 | Status: SHIPPED | OUTPATIENT
Start: 2025-09-03

## 2025-09-04 ENCOUNTER — TELEPHONE (OUTPATIENT)
Dept: PHARMACY | Facility: CLINIC | Age: 55
End: 2025-09-04